# Patient Record
Sex: FEMALE | Race: ASIAN | Employment: FULL TIME | URBAN - METROPOLITAN AREA
[De-identification: names, ages, dates, MRNs, and addresses within clinical notes are randomized per-mention and may not be internally consistent; named-entity substitution may affect disease eponyms.]

---

## 2019-02-20 ENCOUNTER — OFFICE VISIT (OUTPATIENT)
Dept: OBGYN CLINIC | Facility: CLINIC | Age: 44
End: 2019-02-20
Payer: COMMERCIAL

## 2019-02-20 VITALS
HEIGHT: 65 IN | SYSTOLIC BLOOD PRESSURE: 112 MMHG | WEIGHT: 162 LBS | BODY MASS INDEX: 26.99 KG/M2 | DIASTOLIC BLOOD PRESSURE: 62 MMHG | HEART RATE: 74 BPM

## 2019-02-20 DIAGNOSIS — N76.0 VAGINITIS AND VULVOVAGINITIS: ICD-10-CM

## 2019-02-20 DIAGNOSIS — Z01.419 WELL WOMAN EXAM WITH ROUTINE GYNECOLOGICAL EXAM: ICD-10-CM

## 2019-02-20 DIAGNOSIS — Z12.39 SCREENING FOR MALIGNANT NEOPLASM OF BREAST: Primary | ICD-10-CM

## 2019-02-20 DIAGNOSIS — Z12.4 PAPANICOLAOU SMEAR FOR CERVICAL CANCER SCREENING: ICD-10-CM

## 2019-02-20 PROCEDURE — 87624 HPV HI-RISK TYP POOLED RSLT: CPT | Performed by: OBSTETRICS & GYNECOLOGY

## 2019-02-20 PROCEDURE — 87660 TRICHOMONAS VAGIN DIR PROBE: CPT | Performed by: OBSTETRICS & GYNECOLOGY

## 2019-02-20 PROCEDURE — 87480 CANDIDA DNA DIR PROBE: CPT | Performed by: OBSTETRICS & GYNECOLOGY

## 2019-02-20 PROCEDURE — 87510 GARDNER VAG DNA DIR PROBE: CPT | Performed by: OBSTETRICS & GYNECOLOGY

## 2019-02-20 PROCEDURE — 99386 PREV VISIT NEW AGE 40-64: CPT | Performed by: OBSTETRICS & GYNECOLOGY

## 2019-02-20 PROCEDURE — 88175 CYTOPATH C/V AUTO FLUID REDO: CPT | Performed by: OBSTETRICS & GYNECOLOGY

## 2019-02-20 NOTE — PROGRESS NOTES
Figueroa Matson is a 45year old female  Patient's last menstrual period was 2019 (exact date). Patient presents with:  Wellness Visit  . He was  before no pregnancy.   Is not sexually active for 12 years she thinks she will be getting m Attends [employer] service: Not on file        Active member of club or organization: Not on file        Attends meetings of clubs or organizations: Not on file        Relationship status: Not on file      Intimate partner violence:        Fear of current weakness or numbness.       PHYSICAL EXAM:   Constitutional: well developed, well nourished  Head/Face: normocephalic  Neck/Thyroid: thyroid symmetric, no thyromegaly, no nodules, no adenopathy  Breast: normal without palpable masses, tenderness, asymmetry,

## 2019-02-21 LAB — HPV I/H RISK 1 DNA SPEC QL NAA+PROBE: NEGATIVE

## 2019-02-28 NOTE — PROGRESS NOTES
Called pt and LMOVM on cell phone with normal results per Hippa. Left call back number for any further questions.

## 2019-08-06 ENCOUNTER — OFFICE VISIT (OUTPATIENT)
Dept: OBGYN CLINIC | Facility: CLINIC | Age: 44
End: 2019-08-06
Payer: COMMERCIAL

## 2019-08-06 VITALS
HEART RATE: 80 BPM | DIASTOLIC BLOOD PRESSURE: 60 MMHG | SYSTOLIC BLOOD PRESSURE: 118 MMHG | BODY MASS INDEX: 26.66 KG/M2 | WEIGHT: 160 LBS | HEIGHT: 65 IN

## 2019-08-06 DIAGNOSIS — Z30.430 ENCOUNTER FOR INSERTION OF PARAGARD IUD: Primary | ICD-10-CM

## 2019-08-06 DIAGNOSIS — Z30.430 ENCOUNTER FOR INSERTION OF INTRAUTERINE CONTRACEPTIVE DEVICE: ICD-10-CM

## 2019-08-06 DIAGNOSIS — Z97.5 IUD CONTRACEPTION: ICD-10-CM

## 2019-08-06 LAB
CONTROL LINE PRESENT WITH A CLEAR BACKGROUND (YES/NO): YES YES/NO
PREGNANCY TEST, URINE: NEGATIVE

## 2019-08-06 PROCEDURE — 81025 URINE PREGNANCY TEST: CPT | Performed by: OBSTETRICS & GYNECOLOGY

## 2019-08-06 PROCEDURE — 58300 INSERT INTRAUTERINE DEVICE: CPT | Performed by: OBSTETRICS & GYNECOLOGY

## 2019-08-06 RX ORDER — COPPER 313.4 MG/1
1 INTRAUTERINE DEVICE INTRAUTERINE ONCE
Status: COMPLETED | OUTPATIENT
Start: 2019-08-06 | End: 2019-08-06

## 2019-08-06 RX ORDER — IBUPROFEN 200 MG
400 TABLET ORAL ONCE
Status: COMPLETED | OUTPATIENT
Start: 2019-08-06 | End: 2019-08-06

## 2019-08-06 RX ADMIN — IBUPROFEN 400 MG: 200 MG TABLET ORAL at 19:22:00

## 2019-08-06 RX ADMIN — COPPER 1 DEVICE: 313.4 INTRAUTERINE DEVICE INTRAUTERINE at 14:54:00

## 2019-08-06 NOTE — PROGRESS NOTES
IUD Insertion     Pregnancy Results: negative from urine test   Birth control method(s) used:   LMP: 3 d ago    Consent signed. Procedure discussed with the patient in detail including indication, risks, benefits, alternatives and complications.     Proced

## 2019-08-22 ENCOUNTER — TELEPHONE (OUTPATIENT)
Dept: OBGYN CLINIC | Facility: CLINIC | Age: 44
End: 2019-08-22

## 2019-08-22 NOTE — TELEPHONE ENCOUNTER
Patient reassured that spotting/vaginal bleeding is normal for the first month. Some cramping is also normal but she should not have a lot of pain.  Patient states she will give her body more time to adjust.

## 2019-08-22 NOTE — TELEPHONE ENCOUNTER
Per pt she got her paragard iud on 08-06-19 and since then she has been bleeding and experiencing pain. She would like to talk to her about that and ask you if that is normal or not.  She also would like to talk about what her options are or what it can be

## 2019-08-29 ENCOUNTER — OFFICE VISIT (OUTPATIENT)
Dept: OBGYN CLINIC | Facility: CLINIC | Age: 44
End: 2019-08-29
Payer: COMMERCIAL

## 2019-08-29 VITALS
HEIGHT: 65 IN | HEART RATE: 98 BPM | SYSTOLIC BLOOD PRESSURE: 110 MMHG | WEIGHT: 162 LBS | BODY MASS INDEX: 26.99 KG/M2 | DIASTOLIC BLOOD PRESSURE: 66 MMHG

## 2019-08-29 DIAGNOSIS — Z30.432 ENCOUNTER FOR REMOVAL OF INTRAUTERINE CONTRACEPTIVE DEVICE: Primary | ICD-10-CM

## 2019-08-29 PROBLEM — Z97.5 IUD CONTRACEPTION: Status: RESOLVED | Noted: 2019-08-06 | Resolved: 2019-08-29

## 2019-08-29 PROCEDURE — 58301 REMOVE INTRAUTERINE DEVICE: CPT | Performed by: OBSTETRICS & GYNECOLOGY

## 2019-08-29 PROCEDURE — 99213 OFFICE O/P EST LOW 20 MIN: CPT | Performed by: OBSTETRICS & GYNECOLOGY

## 2019-08-29 RX ORDER — IBUPROFEN 200 MG
200 TABLET ORAL ONCE
Status: COMPLETED | OUTPATIENT
Start: 2019-08-29 | End: 2019-08-29

## 2019-08-29 RX ADMIN — IBUPROFEN 200 MG: 200 MG TABLET ORAL at 16:58:00

## 2019-08-29 NOTE — PROGRESS NOTES
IUD Removal     Consent signed. Procedure discussed with the patient in detail including indication, risks, benefits, alternatives and complications.     Pelvic Exam Findings:  Lesion description:  IUD strings seen from cervix    Procedure:  Speculum yaw

## 2020-02-05 ENCOUNTER — APPOINTMENT (OUTPATIENT)
Dept: OBGYN CLINIC | Facility: CLINIC | Age: 45
End: 2020-02-05

## 2020-07-08 PROBLEM — E61.1 IRON DEFICIENCY: Status: ACTIVE | Noted: 2020-07-08

## 2020-07-08 RX ORDER — ACETAMINOPHEN 325 MG/1
650 TABLET ORAL ONCE
Status: CANCELLED
Start: 2020-07-08

## 2020-07-16 ENCOUNTER — OFFICE VISIT (OUTPATIENT)
Dept: HEMATOLOGY/ONCOLOGY | Facility: HOSPITAL | Age: 45
End: 2020-07-16
Attending: FAMILY MEDICINE
Payer: COMMERCIAL

## 2020-07-16 VITALS
RESPIRATION RATE: 18 BRPM | DIASTOLIC BLOOD PRESSURE: 83 MMHG | SYSTOLIC BLOOD PRESSURE: 124 MMHG | OXYGEN SATURATION: 99 % | TEMPERATURE: 98 F

## 2020-07-16 DIAGNOSIS — E61.1 IRON DEFICIENCY: Primary | ICD-10-CM

## 2020-07-16 PROCEDURE — 96365 THER/PROPH/DIAG IV INF INIT: CPT

## 2020-07-16 PROCEDURE — 96376 TX/PRO/DX INJ SAME DRUG ADON: CPT

## 2020-07-16 RX ORDER — ACETAMINOPHEN 325 MG/1
650 TABLET ORAL ONCE
Status: CANCELLED
Start: 2020-07-16

## 2020-07-16 RX ORDER — ACETAMINOPHEN 325 MG/1
650 TABLET ORAL ONCE
Status: COMPLETED | OUTPATIENT
Start: 2020-07-16 | End: 2020-07-16

## 2020-07-16 RX ADMIN — ACETAMINOPHEN 650 MG: 325 TABLET ORAL at 12:10:00

## 2020-07-16 NOTE — PROGRESS NOTES
Education Record    Learner:  Patient    Disease / Diagnosis:Iron deficiency    Barriers / Limitations:  None   Comments:    Method:  Brief focused   Comments:    General Topics:  Infection, Medication, Pain, Precautions, Procedure, Side effects and sympto

## 2020-07-16 NOTE — PATIENT INSTRUCTIONS
Iron Dextran injection  Brand Name: Rebecca Cochran  What is this medicine? IRON DEXTRAN (AHY helio DEX castro) is an iron complex. Iron is used to make healthy red blood cells, which carry oxygen and nutrients through the body.  This medicine is used to treat people It is important not to miss your dose. Call your doctor or health care professional if you are unable to keep an appointment. Where should I keep my medicine? This drug is given in a hospital or clinic and will not be stored at home.   What should I tell

## 2021-07-13 ENCOUNTER — OFFICE VISIT (OUTPATIENT)
Dept: INTERNAL MEDICINE CLINIC | Facility: CLINIC | Age: 46
End: 2021-07-13
Payer: COMMERCIAL

## 2021-07-13 VITALS
BODY MASS INDEX: 28.46 KG/M2 | HEART RATE: 76 BPM | SYSTOLIC BLOOD PRESSURE: 118 MMHG | HEIGHT: 65 IN | DIASTOLIC BLOOD PRESSURE: 62 MMHG | WEIGHT: 170.81 LBS | OXYGEN SATURATION: 98 % | TEMPERATURE: 99 F

## 2021-07-13 DIAGNOSIS — Z13.29 SCREENING FOR THYROID DISORDER: ICD-10-CM

## 2021-07-13 DIAGNOSIS — Z13.220 SCREENING, LIPID: ICD-10-CM

## 2021-07-13 DIAGNOSIS — Z13.0 SCREENING FOR DEFICIENCY ANEMIA: ICD-10-CM

## 2021-07-13 DIAGNOSIS — Z00.00 ANNUAL PHYSICAL EXAM: Primary | ICD-10-CM

## 2021-07-13 DIAGNOSIS — R79.0 LOW FERRITIN: ICD-10-CM

## 2021-07-13 DIAGNOSIS — Z12.31 VISIT FOR SCREENING MAMMOGRAM: ICD-10-CM

## 2021-07-13 DIAGNOSIS — M79.7 FIBROMYALGIA: ICD-10-CM

## 2021-07-13 DIAGNOSIS — R53.83 FATIGUE, UNSPECIFIED TYPE: ICD-10-CM

## 2021-07-13 DIAGNOSIS — Z78.9 VEGAN: ICD-10-CM

## 2021-07-13 DIAGNOSIS — Z13.1 SCREENING FOR DIABETES MELLITUS: ICD-10-CM

## 2021-07-13 PROCEDURE — 3074F SYST BP LT 130 MM HG: CPT | Performed by: FAMILY MEDICINE

## 2021-07-13 PROCEDURE — 99202 OFFICE O/P NEW SF 15 MIN: CPT | Performed by: FAMILY MEDICINE

## 2021-07-13 PROCEDURE — 3008F BODY MASS INDEX DOCD: CPT | Performed by: FAMILY MEDICINE

## 2021-07-13 PROCEDURE — 3078F DIAST BP <80 MM HG: CPT | Performed by: FAMILY MEDICINE

## 2021-07-13 PROCEDURE — 99386 PREV VISIT NEW AGE 40-64: CPT | Performed by: FAMILY MEDICINE

## 2021-07-13 NOTE — PATIENT INSTRUCTIONS
Prevention Guidelines, Women Ages 36 to 52  Screening tests and vaccines are an important part of managing your health. A screening test is done to find diseases in people who don't have any symptoms.  The goal is to find a disease early so lifestyle arias sigmoidoscopy every 5 years, or  · Colonoscopy every 10 years, or  · CT colonography (virtual colonoscopy) every 5 years, or  · Yearly fecal occult blood test, or  · Yearly fecal immunochemical test every year, or  · Stool DNA test, every 3 years  If you c least 4 weeks after the first dose   Hepatitis A Women at increased risk for infection–talk with your healthcare provider 2 doses given 6 months apart   Hepatitis B Women at increased risk for infection–talk with your healthcare provider 3 doses over 6 mon American Academy of Ophthalmology  Alfonso last reviewed this educational content on 11/1/2017  © 0725-8075 [employer]. All rights reserved. This information is not intended as a substitute for professional medical care.  Always follow your

## 2021-07-14 NOTE — PROGRESS NOTES
HPI/Subjective:   Patient ID: Carolina Trotter is a 42year old female. HPI Here for annual check-up and to establish care. Patient has Gyne and is up to date on pap. Is due for mammogram.   Tries to eat healthy, eats vegan and is trying to limit gluten.  Has and frequency. Musculoskeletal: Positive for myalgias. Negative for arthralgias and joint swelling. Skin: Negative for rash. Neurological: Negative for dizziness, weakness, numbness and headaches. Hematological: Negative for adenopathy.  Does not br unspecified type  1-5. Reviewed age-appropriate preventive health and safety recommendations with patient. Reviewed lab results. Encouraged regular exercise and healthy eating. 6. Mammogram ordered. 7. Check iron studies.  Discussed with patient dependi

## 2021-07-30 ENCOUNTER — HOSPITAL ENCOUNTER (OUTPATIENT)
Dept: MAMMOGRAPHY | Facility: HOSPITAL | Age: 46
Discharge: HOME | End: 2021-07-30
Attending: FAMILY MEDICINE
Payer: COMMERCIAL

## 2021-07-30 DIAGNOSIS — Z12.31 VISIT FOR SCREENING MAMMOGRAM: ICD-10-CM

## 2021-07-30 PROCEDURE — 77063 BREAST TOMOSYNTHESIS BI: CPT | Performed by: FAMILY MEDICINE

## 2021-07-30 PROCEDURE — 77067 SCR MAMMO BI INCL CAD: CPT | Performed by: FAMILY MEDICINE

## 2021-08-06 ENCOUNTER — HOSPITAL ENCOUNTER (OUTPATIENT)
Dept: MAMMOGRAPHY | Facility: HOSPITAL | Age: 46
Discharge: HOME | End: 2021-08-06
Attending: FAMILY MEDICINE
Payer: COMMERCIAL

## 2021-08-06 DIAGNOSIS — R92.2 INCONCLUSIVE MAMMOGRAM: ICD-10-CM

## 2021-08-06 PROCEDURE — 77061 BREAST TOMOSYNTHESIS UNI: CPT | Performed by: FAMILY MEDICINE

## 2021-08-06 PROCEDURE — 77065 DX MAMMO INCL CAD UNI: CPT | Performed by: FAMILY MEDICINE

## 2021-08-06 PROCEDURE — 76642 ULTRASOUND BREAST LIMITED: CPT | Performed by: FAMILY MEDICINE

## 2021-08-09 DIAGNOSIS — R92.8 ABNORMAL MAMMOGRAM: Primary | ICD-10-CM

## 2021-08-20 ENCOUNTER — LABORATORY ENCOUNTER (OUTPATIENT)
Dept: LAB | Age: 46
End: 2021-08-20
Attending: FAMILY MEDICINE
Payer: COMMERCIAL

## 2021-08-20 DIAGNOSIS — M79.7 FIBROMYALGIA: ICD-10-CM

## 2021-08-20 DIAGNOSIS — Z78.9 VEGAN: ICD-10-CM

## 2021-08-20 DIAGNOSIS — R79.0 LOW FERRITIN: ICD-10-CM

## 2021-08-20 DIAGNOSIS — Z13.220 SCREENING, LIPID: ICD-10-CM

## 2021-08-20 DIAGNOSIS — Z13.29 SCREENING FOR THYROID DISORDER: ICD-10-CM

## 2021-08-20 DIAGNOSIS — Z13.1 SCREENING FOR DIABETES MELLITUS: ICD-10-CM

## 2021-08-20 DIAGNOSIS — Z13.0 SCREENING FOR DEFICIENCY ANEMIA: ICD-10-CM

## 2021-08-20 LAB
ALBUMIN SERPL-MCNC: 3.8 G/DL (ref 3.4–5)
ALBUMIN/GLOB SERPL: 1 {RATIO} (ref 1–2)
ALP LIVER SERPL-CCNC: 56 U/L
ALT SERPL-CCNC: 31 U/L
ANION GAP SERPL CALC-SCNC: 6 MMOL/L (ref 0–18)
AST SERPL-CCNC: 21 U/L (ref 15–37)
BASOPHILS # BLD AUTO: 0.08 X10(3) UL (ref 0–0.2)
BASOPHILS NFR BLD AUTO: 1.1 %
BILIRUB SERPL-MCNC: 0.5 MG/DL (ref 0.1–2)
BUN BLD-MCNC: 9 MG/DL (ref 7–18)
CALCIUM BLD-MCNC: 9 MG/DL (ref 8.5–10.1)
CHLORIDE SERPL-SCNC: 106 MMOL/L (ref 98–112)
CHOLEST SMN-MCNC: 175 MG/DL (ref ?–200)
CO2 SERPL-SCNC: 25 MMOL/L (ref 21–32)
CREAT BLD-MCNC: 0.53 MG/DL
DEPRECATED HBV CORE AB SER IA-ACNC: 88.3 NG/ML
EOSINOPHIL # BLD AUTO: 0.12 X10(3) UL (ref 0–0.7)
EOSINOPHIL NFR BLD AUTO: 1.7 %
ERYTHROCYTE [DISTWIDTH] IN BLOOD BY AUTOMATED COUNT: 12.6 %
FOLATE SERPL-MCNC: 29.1 NG/ML (ref 8.7–?)
GLOBULIN PLAS-MCNC: 3.7 G/DL (ref 2.8–4.4)
GLUCOSE BLD-MCNC: 91 MG/DL (ref 70–99)
HCT VFR BLD AUTO: 41.2 %
HDLC SERPL-MCNC: 69 MG/DL (ref 40–59)
HGB BLD-MCNC: 12.9 G/DL
IMM GRANULOCYTES # BLD AUTO: 0.03 X10(3) UL (ref 0–1)
IMM GRANULOCYTES NFR BLD: 0.4 %
IRON SATURATION: 29 %
IRON SERPL-MCNC: 92 UG/DL
LDLC SERPL CALC-MCNC: 94 MG/DL (ref ?–100)
LYMPHOCYTES # BLD AUTO: 2.88 X10(3) UL (ref 1–4)
LYMPHOCYTES NFR BLD AUTO: 40.2 %
M PROTEIN MFR SERPL ELPH: 7.5 G/DL (ref 6.4–8.2)
MCH RBC QN AUTO: 30.3 PG (ref 26–34)
MCHC RBC AUTO-ENTMCNC: 31.3 G/DL (ref 31–37)
MCV RBC AUTO: 96.7 FL
MONOCYTES # BLD AUTO: 0.54 X10(3) UL (ref 0.1–1)
MONOCYTES NFR BLD AUTO: 7.5 %
NEUTROPHILS # BLD AUTO: 3.52 X10 (3) UL (ref 1.5–7.7)
NEUTROPHILS # BLD AUTO: 3.52 X10(3) UL (ref 1.5–7.7)
NEUTROPHILS NFR BLD AUTO: 49.1 %
NONHDLC SERPL-MCNC: 106 MG/DL (ref ?–130)
OSMOLALITY SERPL CALC.SUM OF ELEC: 282 MOSM/KG (ref 275–295)
PATIENT FASTING Y/N/NP: YES
PATIENT FASTING Y/N/NP: YES
PLATELET # BLD AUTO: 254 10(3)UL (ref 150–450)
POTASSIUM SERPL-SCNC: 4.1 MMOL/L (ref 3.5–5.1)
RBC # BLD AUTO: 4.26 X10(6)UL
SODIUM SERPL-SCNC: 137 MMOL/L (ref 136–145)
TOTAL IRON BINDING CAPACITY: 316 UG/DL (ref 240–450)
TRANSFERRIN SERPL-MCNC: 212 MG/DL (ref 200–360)
TRIGL SERPL-MCNC: 65 MG/DL (ref 30–149)
TSI SER-ACNC: 2.72 MIU/ML (ref 0.36–3.74)
VIT B12 SERPL-MCNC: 441 PG/ML (ref 193–986)
VIT D+METAB SERPL-MCNC: 23.4 NG/ML (ref 30–100)
VLDLC SERPL CALC-MCNC: 11 MG/DL (ref 0–30)
WBC # BLD AUTO: 7.2 X10(3) UL (ref 4–11)

## 2021-08-20 PROCEDURE — 80061 LIPID PANEL: CPT | Performed by: FAMILY MEDICINE

## 2021-08-20 PROCEDURE — 83550 IRON BINDING TEST: CPT | Performed by: FAMILY MEDICINE

## 2021-08-20 PROCEDURE — 83540 ASSAY OF IRON: CPT | Performed by: FAMILY MEDICINE

## 2021-08-20 PROCEDURE — 82728 ASSAY OF FERRITIN: CPT | Performed by: FAMILY MEDICINE

## 2021-08-20 PROCEDURE — 80050 GENERAL HEALTH PANEL: CPT | Performed by: FAMILY MEDICINE

## 2021-08-20 PROCEDURE — 82607 VITAMIN B-12: CPT | Performed by: FAMILY MEDICINE

## 2021-08-20 PROCEDURE — 82306 VITAMIN D 25 HYDROXY: CPT | Performed by: FAMILY MEDICINE

## 2021-08-20 PROCEDURE — 82746 ASSAY OF FOLIC ACID SERUM: CPT | Performed by: FAMILY MEDICINE

## 2022-01-31 ENCOUNTER — TELEPHONE (OUTPATIENT)
Dept: INTERNAL MEDICINE CLINIC | Facility: CLINIC | Age: 47
End: 2022-01-31

## 2022-01-31 NOTE — TELEPHONE ENCOUNTER
Pt states that she got a letter that she is due for 6 month follow-up breast ultrasound, please advise?  She would like a call once placed so she can call and scehdule

## 2022-01-31 NOTE — TELEPHONE ENCOUNTER
PSR:  Please call patient back to let her know the left breast US has already been ordered.   Thank you

## 2022-03-18 ENCOUNTER — HOSPITAL ENCOUNTER (OUTPATIENT)
Dept: MAMMOGRAPHY | Facility: HOSPITAL | Age: 47
Discharge: HOME | End: 2022-03-18
Attending: FAMILY MEDICINE
Payer: COMMERCIAL

## 2022-03-18 DIAGNOSIS — R92.8 ABNORMAL MAMMOGRAM: ICD-10-CM

## 2022-03-18 PROCEDURE — 76641 ULTRASOUND BREAST COMPLETE: CPT | Performed by: FAMILY MEDICINE

## 2022-07-15 ENCOUNTER — TELEPHONE (OUTPATIENT)
Dept: INTERNAL MEDICINE CLINIC | Facility: CLINIC | Age: 47
End: 2022-07-15

## 2022-08-23 ENCOUNTER — TELEPHONE (OUTPATIENT)
Dept: INTERNAL MEDICINE CLINIC | Facility: CLINIC | Age: 47
End: 2022-08-23

## 2022-08-23 DIAGNOSIS — R79.0 LOW FERRITIN: ICD-10-CM

## 2022-08-23 DIAGNOSIS — Z13.220 SCREENING, LIPID: ICD-10-CM

## 2022-08-23 DIAGNOSIS — M79.7 FIBROMYALGIA: ICD-10-CM

## 2022-08-23 DIAGNOSIS — Z13.0 SCREENING FOR DEFICIENCY ANEMIA: ICD-10-CM

## 2022-08-23 DIAGNOSIS — Z00.00 ANNUAL PHYSICAL EXAM: Primary | ICD-10-CM

## 2022-08-23 DIAGNOSIS — Z13.1 SCREENING FOR DIABETES MELLITUS: ICD-10-CM

## 2022-08-23 NOTE — TELEPHONE ENCOUNTER
Pt sent this via [employer]. I have scheduled lab work on Aug 29th. I also wanted to get my Vitamin D and Ferritin levels tested. Will these be automatically included? Also, I have been experiencing really bad breath (not related to oral hygiene), and wanted to see if the Dr would recommend liver/kidney checkup. I have contact fatigue and pain due to my fybromyalgia, and it is hard to determine if I have any other symptoms of weak liver/kidney function. Is there anything in the blood test that might need to be added for that?

## 2022-08-23 NOTE — TELEPHONE ENCOUNTER
Future Appointments   Date Time Provider Peewee Phan   9/12/2022  1:00 PM Cori Baptiste,  [facility]     Orders to edward- Pt informed that labs need to be completed no sooner than 2 weeks prior to the appt.  Pt aware to fast-no call back required

## 2022-09-14 ENCOUNTER — HOSPITAL ENCOUNTER (OUTPATIENT)
Dept: MAMMOGRAPHY | Facility: HOSPITAL | Age: 47
Discharge: HOME | End: 2022-09-14
Attending: FAMILY MEDICINE
Payer: COMMERCIAL

## 2022-09-14 DIAGNOSIS — R92.8 ABNORMAL MAMMOGRAM: ICD-10-CM

## 2022-09-14 DIAGNOSIS — R92.8 ABNORMAL MAMMOGRAM: Primary | ICD-10-CM

## 2022-09-14 PROCEDURE — 76642 ULTRASOUND BREAST LIMITED: CPT | Performed by: FAMILY MEDICINE

## 2022-09-14 PROCEDURE — 77066 DX MAMMO INCL CAD BI: CPT | Performed by: FAMILY MEDICINE

## 2022-09-14 PROCEDURE — 77062 BREAST TOMOSYNTHESIS BI: CPT | Performed by: FAMILY MEDICINE

## 2022-11-22 ENCOUNTER — TELEPHONE (OUTPATIENT)
Dept: INTERNAL MEDICINE CLINIC | Facility: CLINIC | Age: 47
End: 2022-11-22

## 2022-11-22 NOTE — TELEPHONE ENCOUNTER
Patient notified she can go to the  for treatment or see AMS 11/23/22 at 10am.  Pt states she would like to see AMS at 10m on 11/23/22. Pt scheduled and aware per AMS.

## 2022-11-22 NOTE — TELEPHONE ENCOUNTER
Pt called stating she may have UTI-s/s started yesterday-burning when voiding and burning afterwards-wants appt-nothing available

## 2022-11-23 ENCOUNTER — OFFICE VISIT (OUTPATIENT)
Dept: INTERNAL MEDICINE CLINIC | Facility: CLINIC | Age: 47
End: 2022-11-23
Payer: COMMERCIAL

## 2022-11-23 VITALS
HEART RATE: 92 BPM | DIASTOLIC BLOOD PRESSURE: 64 MMHG | SYSTOLIC BLOOD PRESSURE: 118 MMHG | HEIGHT: 65 IN | RESPIRATION RATE: 20 BRPM | OXYGEN SATURATION: 98 % | WEIGHT: 173 LBS | BODY MASS INDEX: 28.82 KG/M2

## 2022-11-23 DIAGNOSIS — R30.0 DYSURIA: Primary | ICD-10-CM

## 2022-11-23 LAB
APPEARANCE: CLEAR
BILIRUBIN: NEGATIVE
GLUCOSE (URINE DIPSTICK): NEGATIVE MG/DL
KETONES (URINE DIPSTICK): NEGATIVE MG/DL
LEUKOCYTES: NEGATIVE
MULTISTIX LOT#: ABNORMAL NUMERIC
NITRITE, URINE: NEGATIVE
PH, URINE: 7 (ref 4.5–8)
PROTEIN (URINE DIPSTICK): NEGATIVE MG/DL
SPECIFIC GRAVITY: 1.02 (ref 1–1.03)
URINE-COLOR: YELLOW
UROBILINOGEN,SEMI-QN: 0.2 MG/DL (ref 0–1.9)

## 2022-11-23 PROCEDURE — 81003 URINALYSIS AUTO W/O SCOPE: CPT | Performed by: FAMILY MEDICINE

## 2022-11-23 PROCEDURE — 3078F DIAST BP <80 MM HG: CPT | Performed by: FAMILY MEDICINE

## 2022-11-23 PROCEDURE — 3074F SYST BP LT 130 MM HG: CPT | Performed by: FAMILY MEDICINE

## 2022-11-23 PROCEDURE — 3008F BODY MASS INDEX DOCD: CPT | Performed by: FAMILY MEDICINE

## 2022-11-23 PROCEDURE — 99213 OFFICE O/P EST LOW 20 MIN: CPT | Performed by: FAMILY MEDICINE

## 2022-11-23 PROCEDURE — 87086 URINE CULTURE/COLONY COUNT: CPT | Performed by: FAMILY MEDICINE

## 2022-12-13 ENCOUNTER — OFFICE VISIT (OUTPATIENT)
Facility: CLINIC | Age: 47
End: 2022-12-13
Payer: COMMERCIAL

## 2022-12-13 VITALS
HEIGHT: 65 IN | WEIGHT: 175 LBS | BODY MASS INDEX: 29.16 KG/M2 | DIASTOLIC BLOOD PRESSURE: 52 MMHG | HEART RATE: 88 BPM | SYSTOLIC BLOOD PRESSURE: 110 MMHG

## 2022-12-13 DIAGNOSIS — N76.0 VAGINITIS AND VULVOVAGINITIS: ICD-10-CM

## 2022-12-13 DIAGNOSIS — Z12.4 PAPANICOLAOU SMEAR FOR CERVICAL CANCER SCREENING: ICD-10-CM

## 2022-12-13 DIAGNOSIS — Z01.419 WELL WOMAN EXAM WITH ROUTINE GYNECOLOGICAL EXAM: ICD-10-CM

## 2022-12-13 DIAGNOSIS — Z12.31 ENCOUNTER FOR SCREENING MAMMOGRAM FOR BREAST CANCER: Primary | ICD-10-CM

## 2022-12-13 PROCEDURE — 87510 GARDNER VAG DNA DIR PROBE: CPT | Performed by: OBSTETRICS & GYNECOLOGY

## 2022-12-13 PROCEDURE — 3078F DIAST BP <80 MM HG: CPT | Performed by: OBSTETRICS & GYNECOLOGY

## 2022-12-13 PROCEDURE — 99396 PREV VISIT EST AGE 40-64: CPT | Performed by: OBSTETRICS & GYNECOLOGY

## 2022-12-13 PROCEDURE — 3008F BODY MASS INDEX DOCD: CPT | Performed by: OBSTETRICS & GYNECOLOGY

## 2022-12-13 PROCEDURE — 3074F SYST BP LT 130 MM HG: CPT | Performed by: OBSTETRICS & GYNECOLOGY

## 2022-12-13 PROCEDURE — 87480 CANDIDA DNA DIR PROBE: CPT | Performed by: OBSTETRICS & GYNECOLOGY

## 2022-12-13 PROCEDURE — 87660 TRICHOMONAS VAGIN DIR PROBE: CPT | Performed by: OBSTETRICS & GYNECOLOGY

## 2022-12-13 RX ORDER — ERGOCALCIFEROL 1.25 MG/1
CAPSULE ORAL
COMMUNITY

## 2022-12-16 ENCOUNTER — LAB ENCOUNTER (OUTPATIENT)
Dept: LAB | Age: 47
End: 2022-12-16
Attending: FAMILY MEDICINE
Payer: COMMERCIAL

## 2022-12-16 ENCOUNTER — TELEPHONE (OUTPATIENT)
Facility: CLINIC | Age: 47
End: 2022-12-16

## 2022-12-16 DIAGNOSIS — Z13.0 SCREENING FOR DEFICIENCY ANEMIA: ICD-10-CM

## 2022-12-16 DIAGNOSIS — Z00.00 ANNUAL PHYSICAL EXAM: ICD-10-CM

## 2022-12-16 DIAGNOSIS — R79.0 LOW FERRITIN: ICD-10-CM

## 2022-12-16 DIAGNOSIS — M79.7 FIBROMYALGIA: ICD-10-CM

## 2022-12-16 DIAGNOSIS — Z13.220 SCREENING, LIPID: ICD-10-CM

## 2022-12-16 DIAGNOSIS — Z13.1 SCREENING FOR DIABETES MELLITUS: ICD-10-CM

## 2022-12-16 DIAGNOSIS — Z01.419 WELL WOMAN EXAM WITH ROUTINE GYNECOLOGICAL EXAM: ICD-10-CM

## 2022-12-16 LAB
ALBUMIN SERPL-MCNC: 3.6 G/DL (ref 3.4–5)
ALBUMIN/GLOB SERPL: 1 {RATIO} (ref 1–2)
ALP LIVER SERPL-CCNC: 57 U/L
ALT SERPL-CCNC: 21 U/L
ANION GAP SERPL CALC-SCNC: 5 MMOL/L (ref 0–18)
AST SERPL-CCNC: 15 U/L (ref 15–37)
BASOPHILS # BLD AUTO: 0.08 X10(3) UL (ref 0–0.2)
BASOPHILS NFR BLD AUTO: 1.3 %
BILIRUB SERPL-MCNC: 0.3 MG/DL (ref 0.1–2)
BUN BLD-MCNC: 11 MG/DL (ref 7–18)
CALCIUM BLD-MCNC: 8.6 MG/DL (ref 8.5–10.1)
CHLORIDE SERPL-SCNC: 105 MMOL/L (ref 98–112)
CHOLEST SERPL-MCNC: 183 MG/DL (ref ?–200)
CO2 SERPL-SCNC: 25 MMOL/L (ref 21–32)
CREAT BLD-MCNC: 0.59 MG/DL
DEPRECATED HBV CORE AB SER IA-ACNC: 11.2 NG/ML
EOSINOPHIL # BLD AUTO: 0.24 X10(3) UL (ref 0–0.7)
EOSINOPHIL NFR BLD AUTO: 3.9 %
ERYTHROCYTE [DISTWIDTH] IN BLOOD BY AUTOMATED COUNT: 13.1 %
FASTING PATIENT LIPID ANSWER: YES
FASTING STATUS PATIENT QL REPORTED: YES
GFR SERPLBLD BASED ON 1.73 SQ M-ARVRAT: 113 ML/MIN/1.73M2 (ref 60–?)
GLOBULIN PLAS-MCNC: 3.7 G/DL (ref 2.8–4.4)
GLUCOSE BLD-MCNC: 92 MG/DL (ref 70–99)
HCT VFR BLD AUTO: 38.1 %
HDLC SERPL-MCNC: 69 MG/DL (ref 40–59)
HGB BLD-MCNC: 12.4 G/DL
IMM GRANULOCYTES # BLD AUTO: 0.02 X10(3) UL (ref 0–1)
IMM GRANULOCYTES NFR BLD: 0.3 %
IRON SATN MFR SERPL: 12 %
IRON SERPL-MCNC: 46 UG/DL
LDLC SERPL CALC-MCNC: 99 MG/DL (ref ?–100)
LYMPHOCYTES # BLD AUTO: 2.61 X10(3) UL (ref 1–4)
LYMPHOCYTES NFR BLD AUTO: 42.7 %
MCH RBC QN AUTO: 29.7 PG (ref 26–34)
MCHC RBC AUTO-ENTMCNC: 32.5 G/DL (ref 31–37)
MCV RBC AUTO: 91.4 FL
MONOCYTES # BLD AUTO: 0.4 X10(3) UL (ref 0.1–1)
MONOCYTES NFR BLD AUTO: 6.5 %
NEUTROPHILS # BLD AUTO: 2.76 X10 (3) UL (ref 1.5–7.7)
NEUTROPHILS # BLD AUTO: 2.76 X10(3) UL (ref 1.5–7.7)
NEUTROPHILS NFR BLD AUTO: 45.3 %
NONHDLC SERPL-MCNC: 114 MG/DL (ref ?–130)
OSMOLALITY SERPL CALC.SUM OF ELEC: 279 MOSM/KG (ref 275–295)
PLATELET # BLD AUTO: 275 10(3)UL (ref 150–450)
POTASSIUM SERPL-SCNC: 4.2 MMOL/L (ref 3.5–5.1)
PROT SERPL-MCNC: 7.3 G/DL (ref 6.4–8.2)
RBC # BLD AUTO: 4.17 X10(6)UL
SODIUM SERPL-SCNC: 135 MMOL/L (ref 136–145)
TIBC SERPL-MCNC: 398 UG/DL (ref 240–450)
TRANSFERRIN SERPL-MCNC: 267 MG/DL (ref 200–360)
TRIGL SERPL-MCNC: 79 MG/DL (ref 30–149)
TSI SER-ACNC: 3.72 MIU/ML (ref 0.36–3.74)
VIT D+METAB SERPL-MCNC: 30.7 NG/ML (ref 30–100)
VLDLC SERPL CALC-MCNC: 13 MG/DL (ref 0–30)
WBC # BLD AUTO: 6.1 X10(3) UL (ref 4–11)

## 2022-12-16 PROCEDURE — 83550 IRON BINDING TEST: CPT | Performed by: FAMILY MEDICINE

## 2022-12-16 PROCEDURE — 84443 ASSAY THYROID STIM HORMONE: CPT | Performed by: OBSTETRICS & GYNECOLOGY

## 2022-12-16 PROCEDURE — 85025 COMPLETE CBC W/AUTO DIFF WBC: CPT | Performed by: FAMILY MEDICINE

## 2022-12-16 PROCEDURE — 80053 COMPREHEN METABOLIC PANEL: CPT | Performed by: FAMILY MEDICINE

## 2022-12-16 PROCEDURE — 82306 VITAMIN D 25 HYDROXY: CPT | Performed by: FAMILY MEDICINE

## 2022-12-16 PROCEDURE — 80061 LIPID PANEL: CPT | Performed by: FAMILY MEDICINE

## 2022-12-16 PROCEDURE — 82728 ASSAY OF FERRITIN: CPT | Performed by: FAMILY MEDICINE

## 2022-12-16 PROCEDURE — 83540 ASSAY OF IRON: CPT | Performed by: FAMILY MEDICINE

## 2022-12-16 NOTE — PROGRESS NOTES
Pt aware of results & recommendations: May start synthroid 25  or repeat tsh I year. Pt will wait and repeat in 1 year. Verbalized understanding.

## 2022-12-16 NOTE — TELEPHONE ENCOUNTER
Spoke with pt. I gave her [facility]'s phone number to contact to schedule a colonoscopy. Verbalized understanding.

## 2022-12-26 LAB — HPV I/H RISK 1 DNA SPEC QL NAA+PROBE: NEGATIVE

## 2022-12-27 LAB — LAST PAP RESULT: NORMAL

## 2023-01-03 ENCOUNTER — PATIENT MESSAGE (OUTPATIENT)
Dept: INTERNAL MEDICINE CLINIC | Facility: CLINIC | Age: 48
End: 2023-01-03

## 2023-01-03 NOTE — TELEPHONE ENCOUNTER
From: Kip Szymanski  To: Wellington Rojas DO  Sent: 1/3/2023 10:47 AM CST  Subject: Persistent Cough    Hi Dr. Amadeo Jameson! I have persistent cough since last night. Before that for 2 days had a sore throat. I have been taking home remedies. Is this something I need to get checked up?     Thank you,  Ann

## 2023-03-15 ENCOUNTER — TELEPHONE (OUTPATIENT)
Facility: CLINIC | Age: 48
End: 2023-03-15

## 2023-06-14 ENCOUNTER — TELEPHONE (OUTPATIENT)
Dept: INTERNAL MEDICINE CLINIC | Facility: CLINIC | Age: 48
End: 2023-06-14

## 2023-06-14 DIAGNOSIS — Z13.228 SCREENING FOR METABOLIC DISORDER: ICD-10-CM

## 2023-06-14 DIAGNOSIS — Z13.220 SCREENING FOR LIPID DISORDERS: ICD-10-CM

## 2023-06-14 DIAGNOSIS — Z00.00 ROUTINE GENERAL MEDICAL EXAMINATION AT A HEALTH CARE FACILITY: Primary | ICD-10-CM

## 2023-06-14 DIAGNOSIS — Z13.0 SCREENING FOR DISORDER OF BLOOD AND BLOOD-FORMING ORGANS: ICD-10-CM

## 2023-06-14 DIAGNOSIS — R79.0 LOW FERRITIN: ICD-10-CM

## 2023-06-14 NOTE — TELEPHONE ENCOUNTER
Future Appointments   Date Time Provider Peewee Phan   7/3/2023  3:30 PM Mateus Garcia DO [facility]     Patient is scheduled for Annual Physical.  Please place orders with THE [facility]. Patient aware to fast.  No call back required. Patient informed that labs need to be completed no sooner than 2 weeks prior to the appointment.

## 2023-07-03 ENCOUNTER — OFFICE VISIT (OUTPATIENT)
Dept: INTERNAL MEDICINE CLINIC | Facility: CLINIC | Age: 48
End: 2023-07-03
Payer: COMMERCIAL

## 2023-07-03 VITALS
HEIGHT: 65 IN | SYSTOLIC BLOOD PRESSURE: 108 MMHG | BODY MASS INDEX: 27.66 KG/M2 | OXYGEN SATURATION: 98 % | TEMPERATURE: 98 F | DIASTOLIC BLOOD PRESSURE: 60 MMHG | HEART RATE: 67 BPM | WEIGHT: 166 LBS | RESPIRATION RATE: 18 BRPM

## 2023-07-03 DIAGNOSIS — R53.82 CHRONIC FATIGUE: ICD-10-CM

## 2023-07-03 DIAGNOSIS — Z00.00 ANNUAL PHYSICAL EXAM: Primary | ICD-10-CM

## 2023-07-03 DIAGNOSIS — E61.1 IRON DEFICIENCY: ICD-10-CM

## 2023-07-03 PROCEDURE — 3078F DIAST BP <80 MM HG: CPT | Performed by: FAMILY MEDICINE

## 2023-07-03 PROCEDURE — 3008F BODY MASS INDEX DOCD: CPT | Performed by: FAMILY MEDICINE

## 2023-07-03 PROCEDURE — 3074F SYST BP LT 130 MM HG: CPT | Performed by: FAMILY MEDICINE

## 2023-07-03 PROCEDURE — 99213 OFFICE O/P EST LOW 20 MIN: CPT | Performed by: FAMILY MEDICINE

## 2023-07-03 PROCEDURE — 99396 PREV VISIT EST AGE 40-64: CPT | Performed by: FAMILY MEDICINE

## 2023-07-04 NOTE — PROGRESS NOTES
Subjective:   Patient ID: iLnh Bray is a 57year old female. HPI Here for annual check-up. Patient continues to struggle with chronic fatigue. Seeing functional medicine specialist and is on multiple supplements. Taking iron and is constipating for her. Struggling to exercise because of her fatigue. History/Other:   Past Medical History:   Diagnosis Date    Sleep apnea      Past Surgical History:   Procedure Laterality Date    INSERT INTRAUTERINE DEVICE  08/06/2019    Copper IUD inserted - Dr. Dian Leo  08/29/2019    C/o spotting, cramping, acne. IUD removed less than 1 month from placement - Dr. Haider Muniz History     Socioeconomic History    Marital status:    Tobacco Use    Smoking status: Never    Smokeless tobacco: Never   Vaping Use    Vaping Use: Never used   Substance and Sexual Activity    Alcohol use: No    Drug use: No   Other Topics Concern    Caffeine Concern No     Comment: 2 cups coffee 1 cup tea daily    Exercise Yes     Comment: 3-4 d/wk    Seat Belt Yes     Family History   Problem Relation Age of Onset    Prostate Cancer Father 68    Heart Disorder Father     Heart Surgery Father         stents in  heart    Diabetes Mother     Pacemaker Mother     No Known Problems Maternal Grandmother     No Known Problems Maternal Grandfather     No Known Problems Paternal Grandmother     No Known Problems Paternal Grandfather     Colon Cancer Neg     Pancreatic Cancer Neg     Uterine Cancer Neg     Ovarian Cancer Neg     Breast Cancer Neg     Endometriosis Neg     Infertility Neg        Review of Systems   Constitutional:  Positive for fatigue. Negative for activity change, appetite change and fever. HENT:  Negative for ear pain, hearing loss and rhinorrhea. Eyes:  Negative for visual disturbance. Respiratory:  Negative for cough and shortness of breath.     Cardiovascular:  Negative for chest pain, palpitations and leg swelling. Gastrointestinal:  Negative for abdominal pain and constipation. Endocrine: Negative for polydipsia, polyphagia and polyuria. Genitourinary:  Negative for dysuria and frequency. Musculoskeletal:  Negative for arthralgias and joint swelling. Skin:  Negative for rash. Neurological:  Negative for dizziness, weakness, numbness and headaches. Hematological:  Negative for adenopathy. Does not bruise/bleed easily. Psychiatric/Behavioral:  Negative for dysphoric mood. The patient is not nervous/anxious. Current Outpatient Medications   Medication Sig Dispense Refill    Misc Natural Products (SAW PALMETTO PLUS) Oral Cap Take by mouth. Digestive Enzymes (BETAINE HCL) 650-130 MG Oral Cap Take by mouth.      ergocalciferol 1.25 MG (93568 UT) Oral Cap Take by mouth every 7 days. Multiple Vitamins-Minerals (MULTIVITAMIN ADULT OR) Take by mouth. Allergies:  Egg                     DIARRHEA, NAUSEA AND VOMITING,                            OTHER (SEE COMMENTS)    Comment:Stomach aches  Mushroom Extract Co*    DIARRHEA, NAUSEA AND VOMITING,                            OTHER (SEE COMMENTS)    Comment:Stomach aches    Objective:   Physical Exam  Vitals reviewed. Constitutional:       Appearance: Normal appearance. She is well-developed. HENT:      Head: Normocephalic and atraumatic. Right Ear: Tympanic membrane, ear canal and external ear normal.      Left Ear: Tympanic membrane, ear canal and external ear normal.      Mouth/Throat:      Pharynx: No posterior oropharyngeal erythema. Eyes:      Conjunctiva/sclera: Conjunctivae normal.      Pupils: Pupils are equal, round, and reactive to light. Cardiovascular:      Rate and Rhythm: Normal rate and regular rhythm. Heart sounds: Normal heart sounds. Pulmonary:      Effort: Pulmonary effort is normal.      Breath sounds: Normal breath sounds. Skin:     General: Skin is warm and dry.    Neurological:      Mental Status: She is alert. Psychiatric:         Behavior: Behavior normal.         Assessment & Plan:   Annual physical exam  (primary encounter diagnosis)  Chronic fatigue  Iron deficiency  Reviewed age-appropriate preventive health and safety recommendations with patient. Check labs. Encouraged regular exercise and healthy eating. Check labs. Continue f/u with functional medicine practitioner. Encouraged exercise. Check iron. Can likely switch to every other day.    Orders Placed This Encounter      TSH W Reflex To Free T4      Magnesium [E]      Vitamin B12 [E]      Vitamin D [E]      Meds This Visit:  Requested Prescriptions      No prescriptions requested or ordered in this encounter       Imaging & Referrals:  None

## 2023-07-06 ENCOUNTER — LAB ENCOUNTER (OUTPATIENT)
Dept: LAB | Age: 48
End: 2023-07-06
Attending: FAMILY MEDICINE
Payer: COMMERCIAL

## 2023-07-06 DIAGNOSIS — R53.82 CHRONIC FATIGUE: ICD-10-CM

## 2023-07-06 DIAGNOSIS — Z00.00 ROUTINE GENERAL MEDICAL EXAMINATION AT A HEALTH CARE FACILITY: ICD-10-CM

## 2023-07-06 DIAGNOSIS — Z13.220 SCREENING FOR LIPID DISORDERS: ICD-10-CM

## 2023-07-06 DIAGNOSIS — Z13.228 SCREENING FOR METABOLIC DISORDER: ICD-10-CM

## 2023-07-06 DIAGNOSIS — Z13.0 SCREENING FOR DISORDER OF BLOOD AND BLOOD-FORMING ORGANS: ICD-10-CM

## 2023-07-06 DIAGNOSIS — R79.0 LOW FERRITIN: ICD-10-CM

## 2023-07-06 LAB
ALBUMIN SERPL-MCNC: 3.9 G/DL (ref 3.4–5)
ALBUMIN/GLOB SERPL: 0.9 {RATIO} (ref 1–2)
ALP LIVER SERPL-CCNC: 68 U/L
ALT SERPL-CCNC: 23 U/L
ANION GAP SERPL CALC-SCNC: 3 MMOL/L (ref 0–18)
AST SERPL-CCNC: 16 U/L (ref 15–37)
BASOPHILS # BLD AUTO: 0.07 X10(3) UL (ref 0–0.2)
BASOPHILS NFR BLD AUTO: 0.9 %
BILIRUB SERPL-MCNC: 0.4 MG/DL (ref 0.1–2)
BUN BLD-MCNC: 7 MG/DL (ref 7–18)
CALCIUM BLD-MCNC: 9.2 MG/DL (ref 8.5–10.1)
CHLORIDE SERPL-SCNC: 106 MMOL/L (ref 98–112)
CHOLEST SERPL-MCNC: 175 MG/DL (ref ?–200)
CO2 SERPL-SCNC: 27 MMOL/L (ref 21–32)
CREAT BLD-MCNC: 0.64 MG/DL
DEPRECATED HBV CORE AB SER IA-ACNC: 34.3 NG/ML
EOSINOPHIL # BLD AUTO: 0.15 X10(3) UL (ref 0–0.7)
EOSINOPHIL NFR BLD AUTO: 1.9 %
ERYTHROCYTE [DISTWIDTH] IN BLOOD BY AUTOMATED COUNT: 13.1 %
FASTING PATIENT LIPID ANSWER: YES
FASTING STATUS PATIENT QL REPORTED: YES
GFR SERPLBLD BASED ON 1.73 SQ M-ARVRAT: 110 ML/MIN/1.73M2 (ref 60–?)
GLOBULIN PLAS-MCNC: 4.3 G/DL (ref 2.8–4.4)
GLUCOSE BLD-MCNC: 96 MG/DL (ref 70–99)
HCT VFR BLD AUTO: 41.7 %
HDLC SERPL-MCNC: 69 MG/DL (ref 40–59)
HGB BLD-MCNC: 13.5 G/DL
IMM GRANULOCYTES # BLD AUTO: 0.03 X10(3) UL (ref 0–1)
IMM GRANULOCYTES NFR BLD: 0.4 %
IRON SATN MFR SERPL: 27 %
IRON SERPL-MCNC: 106 UG/DL
LDLC SERPL CALC-MCNC: 88 MG/DL (ref ?–100)
LYMPHOCYTES # BLD AUTO: 2.75 X10(3) UL (ref 1–4)
LYMPHOCYTES NFR BLD AUTO: 35.6 %
MAGNESIUM SERPL-MCNC: 2.3 MG/DL (ref 1.6–2.6)
MCH RBC QN AUTO: 29.4 PG (ref 26–34)
MCHC RBC AUTO-ENTMCNC: 32.4 G/DL (ref 31–37)
MCV RBC AUTO: 90.8 FL
MONOCYTES # BLD AUTO: 0.5 X10(3) UL (ref 0.1–1)
MONOCYTES NFR BLD AUTO: 6.5 %
NEUTROPHILS # BLD AUTO: 4.22 X10 (3) UL (ref 1.5–7.7)
NEUTROPHILS # BLD AUTO: 4.22 X10(3) UL (ref 1.5–7.7)
NEUTROPHILS NFR BLD AUTO: 54.7 %
NONHDLC SERPL-MCNC: 106 MG/DL (ref ?–130)
OSMOLALITY SERPL CALC.SUM OF ELEC: 280 MOSM/KG (ref 275–295)
PLATELET # BLD AUTO: 248 10(3)UL (ref 150–450)
POTASSIUM SERPL-SCNC: 4.7 MMOL/L (ref 3.5–5.1)
PROT SERPL-MCNC: 8.2 G/DL (ref 6.4–8.2)
RBC # BLD AUTO: 4.59 X10(6)UL
SODIUM SERPL-SCNC: 136 MMOL/L (ref 136–145)
TIBC SERPL-MCNC: 386 UG/DL (ref 240–450)
TRANSFERRIN SERPL-MCNC: 259 MG/DL (ref 200–360)
TRIGL SERPL-MCNC: 102 MG/DL (ref 30–149)
TSI SER-ACNC: 2.43 MIU/ML (ref 0.36–3.74)
VIT B12 SERPL-MCNC: 406 PG/ML (ref 193–986)
VIT D+METAB SERPL-MCNC: 47.6 NG/ML (ref 30–100)
VLDLC SERPL CALC-MCNC: 16 MG/DL (ref 0–30)
WBC # BLD AUTO: 7.7 X10(3) UL (ref 4–11)

## 2023-07-06 PROCEDURE — 83550 IRON BINDING TEST: CPT

## 2023-07-06 PROCEDURE — 82306 VITAMIN D 25 HYDROXY: CPT

## 2023-07-06 PROCEDURE — 82728 ASSAY OF FERRITIN: CPT

## 2023-07-06 PROCEDURE — 84443 ASSAY THYROID STIM HORMONE: CPT

## 2023-07-06 PROCEDURE — 36415 COLL VENOUS BLD VENIPUNCTURE: CPT

## 2023-07-06 PROCEDURE — 83735 ASSAY OF MAGNESIUM: CPT

## 2023-07-06 PROCEDURE — 82607 VITAMIN B-12: CPT

## 2023-07-06 PROCEDURE — 85025 COMPLETE CBC W/AUTO DIFF WBC: CPT

## 2023-07-06 PROCEDURE — 80053 COMPREHEN METABOLIC PANEL: CPT

## 2023-07-06 PROCEDURE — 80061 LIPID PANEL: CPT

## 2023-07-06 PROCEDURE — 83540 ASSAY OF IRON: CPT

## 2023-12-14 ENCOUNTER — TELEPHONE (OUTPATIENT)
Facility: CLINIC | Age: 48
End: 2023-12-14

## 2023-12-14 DIAGNOSIS — Z12.31 BREAST CANCER SCREENING BY MAMMOGRAM: Primary | ICD-10-CM

## 2023-12-14 NOTE — TELEPHONE ENCOUNTER
Pt calling to state she needs a new mammogram order and she insist on getting it done by the end of the year. Please advise.

## 2023-12-18 NOTE — TELEPHONE ENCOUNTER
Message left per HIPAA form letting pt know her mammogram order has been placed. To call with any questions.

## 2023-12-22 PROBLEM — Z12.11 SPECIAL SCREENING FOR MALIGNANT NEOPLASM OF COLON: Status: ACTIVE | Noted: 2023-12-22

## 2023-12-26 ENCOUNTER — TELEPHONE (OUTPATIENT)
Facility: CLINIC | Age: 48
End: 2023-12-26

## 2023-12-26 DIAGNOSIS — R92.8 ABNORMAL MAMMOGRAM: Primary | ICD-10-CM

## 2023-12-26 NOTE — TELEPHONE ENCOUNTER
Called radiology, notable to HENRY Aguayo to verify what diagnosis should be used for the requested order.

## 2023-12-26 NOTE — TELEPHONE ENCOUNTER
Elenita Vázquez w/ mammography called requesting a new order that is being recommended by the radiologist for a Bilateral mammogram w/US

## 2023-12-27 NOTE — TELEPHONE ENCOUNTER
Spoke with Molly Davey in mammography. 6 month dx mammogram with left breast ultrasound was recommended 9/2022. Pt did not have it done. They are requesting a bilateral dx mammogram and ultrasound. Dx- abnormal mammogram. Order pended.

## 2024-01-19 ENCOUNTER — HOSPITAL ENCOUNTER (OUTPATIENT)
Dept: MAMMOGRAPHY | Facility: HOSPITAL | Age: 49
Discharge: HOME | End: 2024-01-19
Attending: OBSTETRICS & GYNECOLOGY
Payer: COMMERCIAL

## 2024-01-19 DIAGNOSIS — R92.8 ABNORMAL MAMMOGRAM: ICD-10-CM

## 2024-01-19 PROCEDURE — 76642 ULTRASOUND BREAST LIMITED: CPT | Performed by: OBSTETRICS & GYNECOLOGY

## 2024-01-19 PROCEDURE — 77062 BREAST TOMOSYNTHESIS BI: CPT | Performed by: OBSTETRICS & GYNECOLOGY

## 2024-01-19 PROCEDURE — 77066 DX MAMMO INCL CAD BI: CPT | Performed by: OBSTETRICS & GYNECOLOGY

## 2024-01-24 DIAGNOSIS — N63.0 BREAST NODULE: Primary | ICD-10-CM

## 2024-04-04 ENCOUNTER — OFFICE VISIT (OUTPATIENT)
Facility: CLINIC | Age: 49
End: 2024-04-04
Payer: COMMERCIAL

## 2024-04-04 VITALS
HEART RATE: 71 BPM | WEIGHT: 170 LBS | BODY MASS INDEX: 28.32 KG/M2 | HEIGHT: 65 IN | DIASTOLIC BLOOD PRESSURE: 70 MMHG | SYSTOLIC BLOOD PRESSURE: 128 MMHG

## 2024-04-04 DIAGNOSIS — Z01.419 WELL WOMAN EXAM WITH ROUTINE GYNECOLOGICAL EXAM: Primary | ICD-10-CM

## 2024-04-04 PROCEDURE — 99396 PREV VISIT EST AGE 40-64: CPT | Performed by: OBSTETRICS & GYNECOLOGY

## 2024-04-04 RX ORDER — BIOTIN 5 MG
1 TABLET ORAL
COMMUNITY

## 2024-04-04 NOTE — PROGRESS NOTES
Ann Tiwari is a 49 year old female  Patient's last menstrual period was 2024.   Chief Complaint   Patient presents with    Wellness Visit     LMP 3/19/24, no OCP   .     She states that she has been treated for fibromyalgia and fatigue.  She wishes to have blood work done.  She is not taking vitamin D at this time.  She says she has night sweats but so does her .  Periods are regular every month but they vary in length and are usually 3 to 5 days.  She has not skipped a period.  She had a colonoscopy with a large polyp removed she has to go back in 3 years.  She is doing mammograms every 6 months.  She also complains of urgency incontinence but does not want to take any medicine for it at this time    OBSTETRICS HISTORY:  OB History    Para Term  AB Living   1 0 0 0 1 0   SAB IAB Ectopic Multiple Live Births   1 0 0 0 0      # Outcome Date GA Lbr Aldo/2nd Weight Sex Delivery Anes PTL Lv   1 SAB  10w0d              GYNE HISTORY:  Periods regular monthly    History   Sexual Activity    Sexual activity: Not Currently        Hx Prior Abnormal Pap: No  Pap Date: 22  Pap Result Notes: neg/neg hpv        MEDICAL HISTORY:  Past Medical History:   Diagnosis Date    Fatigue     Sleep apnea     Stress        SURGICAL HISTORY:  Past Surgical History:   Procedure Laterality Date    Insert intrauterine device  2019    Copper IUD inserted - Dr. Sarah Tolentino     Remove intrauterine device  2019    C/o spotting, cramping, acne. IUD removed less than 1 month from placement - Dr. Tolentino     [place] teeth removed         SOCIAL HISTORY:  Social History     Socioeconomic History    Marital status:      Spouse name: Not on file    Number of children: Not on file    Years of education: Not on file    Highest education level: Not on file   Occupational History    Not on file   Tobacco Use    Smoking status: Never    Smokeless tobacco: Never   Vaping Use    Vaping Use: Never used    Substance and Sexual Activity    Alcohol use: No    Drug use: No    Sexual activity: Not Currently   Other Topics Concern     Service Not Asked    Blood Transfusions Not Asked    Caffeine Concern No     Comment: 2 cups coffee 1 cup tea daily    Occupational Exposure Not Asked    Hobby Hazards Not Asked    Sleep Concern Not Asked    Stress Concern Not Asked    Weight Concern Not Asked    Special Diet Not Asked    Back Care Not Asked    Exercise Yes     Comment: 3-4 d/wk    Bike Helmet Not Asked    Seat Belt Yes    Self-Exams Not Asked   Social History Narrative    Not on file     Social Determinants of Health     Financial Resource Strain: Not on file   Food Insecurity: Not on file   Transportation Needs: Not on file   Physical Activity: Not on file   Stress: Not on file   Social Connections: Not on file   Housing Stability: Not on file       FAMILY HISTORY:  Family History   Problem Relation Age of Onset    Heart Attack Father     Prostate Cancer Father 73    Heart Disorder Father     Heart Surgery Father         stents in  heart    Cancer Mother 78        stomach cancer    Heart Attack Mother     Hypertension Mother     Diabetes Mother     Pacemaker Mother     No Known Problems Maternal Grandmother     No Known Problems Maternal Grandfather     No Known Problems Paternal Grandmother     No Known Problems Paternal Grandfather     Colon Cancer Neg     Pancreatic Cancer Neg     Uterine Cancer Neg     Ovarian Cancer Neg     Breast Cancer Neg     Endometriosis Neg     Infertility Neg        MEDICATIONS:    Current Outpatient Medications:     Cyanocobalamin (RAPID B-12 ENERGY) 200 MCG/SPRAY Oral Liquid, Take 1 spray by mouth 3 (three) times a week at 1600., Disp: , Rfl:     Multiple Vitamins-Minerals (MULTIVITAMIN ADULT OR), Take by mouth., Disp: , Rfl:     ergocalciferol 1.25 MG (34839 UT) Oral Cap, Take by mouth every 7 days. (Patient not taking: Reported on 4/4/2024), Disp: , Rfl:     ALLERGIES:    Allergies    Allergen Reactions    Egg DIARRHEA, NAUSEA AND VOMITING and OTHER (SEE COMMENTS)     Stomach aches    Mushroom Extract Complex DIARRHEA, NAUSEA AND VOMITING and OTHER (SEE COMMENTS)     Stomach aches         Review of Systems:  Constitutional:  Denies fatigue, night sweats, hot flashes  Gastrointestinal:  denies heartburn, abdominal pain, diarrhea or constipation  Genitourinary:  denies dysuria, incontinence, abnormal vaginal discharge, vaginal itching  Skin/Breast:  Denies any breast pain, lumps, or discharge.   Neurological:  denies headaches, extremity weakness or numbness.      PHYSICAL EXAM:   Constitutional: well developed, well nourished  Head/Face: normocephalic  Neck/Thyroid: thyroid symmetric, no thyromegaly, no nodules, no adenopathy  Breast: normal without palpable masses, tenderness, asymmetry, nipple discharge, nipple retraction or skin changes  Abdomen:  soft, nontender, nondistended, no masses  Skin/Hair: no unusual rashes or bruises   Extremities: no edema, no cyanosis  Psychiatric:  Oriented to time, place, person and situation. Appropriate mood and affect    Pelvic Exam:  External Genitalia: normal appearance, hair distribution, and no lesions  Urethral Meatus:  normal in size, location, without lesions and prolapse  Bladder:  No fullness, masses or tenderness  Vagina:  Normal appearance without lesions, no abnormal discharge  Cervix:  Normal without tenderness on motion without lesions.  Uterus: normal in size, contour, position, mobility, without tenderness  Adnexa: normal without masses or tenderness  Perineum: normal  Anus: no hemorroids     Assessment & Plan:  Ann was seen today for wellness visit.    Diagnoses and all orders for this visit:    Well woman exam with routine gynecological exam        Urgency incontinence.

## 2024-07-23 ENCOUNTER — HOSPITAL ENCOUNTER (OUTPATIENT)
Facility: HOSPITAL | Age: 49
Setting detail: OBSERVATION
Discharge: HOME | End: 2024-07-24
Attending: EMERGENCY MEDICINE | Admitting: STUDENT IN AN ORGANIZED HEALTH CARE EDUCATION/TRAINING PROGRAM
Payer: COMMERCIAL

## 2024-07-23 ENCOUNTER — APPOINTMENT (OUTPATIENT)
Dept: CT IMAGING | Facility: HOSPITAL | Age: 49
End: 2024-07-23
Attending: EMERGENCY MEDICINE
Payer: COMMERCIAL

## 2024-07-23 ENCOUNTER — E-VISIT (OUTPATIENT)
Dept: TELEHEALTH | Age: 49
End: 2024-07-23
Payer: COMMERCIAL

## 2024-07-23 DIAGNOSIS — K61.0 PERIANAL ABSCESS: Primary | ICD-10-CM

## 2024-07-23 DIAGNOSIS — L02.91 ABSCESS: ICD-10-CM

## 2024-07-23 DIAGNOSIS — G89.18 POST-OPERATIVE PAIN: ICD-10-CM

## 2024-07-23 DIAGNOSIS — R19.7 DIARRHEA, UNSPECIFIED TYPE: ICD-10-CM

## 2024-07-23 DIAGNOSIS — K59.00 CONSTIPATION, UNSPECIFIED CONSTIPATION TYPE: ICD-10-CM

## 2024-07-23 DIAGNOSIS — K62.89 RECTAL PAIN: Primary | ICD-10-CM

## 2024-07-23 PROBLEM — E87.1 HYPONATREMIA: Status: ACTIVE | Noted: 2024-07-23

## 2024-07-23 LAB
ALBUMIN SERPL-MCNC: 4.7 G/DL (ref 3.2–4.8)
ALBUMIN/GLOB SERPL: 1.5 {RATIO} (ref 1–2)
ALP LIVER SERPL-CCNC: 72 U/L
ALT SERPL-CCNC: 11 U/L
ANION GAP SERPL CALC-SCNC: 5 MMOL/L (ref 0–18)
AST SERPL-CCNC: 15 U/L (ref ?–34)
BASOPHILS # BLD AUTO: 0.08 X10(3) UL (ref 0–0.2)
BASOPHILS NFR BLD AUTO: 0.6 %
BILIRUB SERPL-MCNC: 0.3 MG/DL (ref 0.3–1.2)
BUN BLD-MCNC: 8 MG/DL (ref 9–23)
CALCIUM BLD-MCNC: 9.4 MG/DL (ref 8.7–10.4)
CHLORIDE SERPL-SCNC: 103 MMOL/L (ref 98–112)
CO2 SERPL-SCNC: 27 MMOL/L (ref 21–32)
CREAT BLD-MCNC: 0.67 MG/DL
EGFRCR SERPLBLD CKD-EPI 2021: 108 ML/MIN/1.73M2 (ref 60–?)
EOSINOPHIL # BLD AUTO: 0.07 X10(3) UL (ref 0–0.7)
EOSINOPHIL NFR BLD AUTO: 0.6 %
ERYTHROCYTE [DISTWIDTH] IN BLOOD BY AUTOMATED COUNT: 12.7 %
GLOBULIN PLAS-MCNC: 3.2 G/DL (ref 2.8–4.4)
GLUCOSE BLD-MCNC: 119 MG/DL (ref 70–99)
HCT VFR BLD AUTO: 36.6 %
HGB BLD-MCNC: 12.4 G/DL
IMM GRANULOCYTES # BLD AUTO: 0.04 X10(3) UL (ref 0–1)
IMM GRANULOCYTES NFR BLD: 0.3 %
LYMPHOCYTES # BLD AUTO: 2.56 X10(3) UL (ref 1–4)
LYMPHOCYTES NFR BLD AUTO: 20.5 %
MCH RBC QN AUTO: 29.1 PG (ref 26–34)
MCHC RBC AUTO-ENTMCNC: 33.9 G/DL (ref 31–37)
MCV RBC AUTO: 85.9 FL
MONOCYTES # BLD AUTO: 0.81 X10(3) UL (ref 0.1–1)
MONOCYTES NFR BLD AUTO: 6.5 %
NEUTROPHILS # BLD AUTO: 8.92 X10 (3) UL (ref 1.5–7.7)
NEUTROPHILS # BLD AUTO: 8.92 X10(3) UL (ref 1.5–7.7)
NEUTROPHILS NFR BLD AUTO: 71.5 %
OSMOLALITY SERPL CALC.SUM OF ELEC: 279 MOSM/KG (ref 275–295)
PLATELET # BLD AUTO: 261 10(3)UL (ref 150–450)
POTASSIUM SERPL-SCNC: 3.7 MMOL/L (ref 3.5–5.1)
PROT SERPL-MCNC: 7.9 G/DL (ref 5.7–8.2)
RBC # BLD AUTO: 4.26 X10(6)UL
SODIUM SERPL-SCNC: 135 MMOL/L (ref 136–145)
WBC # BLD AUTO: 12.5 X10(3) UL (ref 4–11)

## 2024-07-23 PROCEDURE — 99421 OL DIG E/M SVC 5-10 MIN: CPT | Performed by: NURSE PRACTITIONER

## 2024-07-23 PROCEDURE — 74177 CT ABD & PELVIS W/CONTRAST: CPT | Performed by: EMERGENCY MEDICINE

## 2024-07-24 ENCOUNTER — ANESTHESIA EVENT (OUTPATIENT)
Dept: SURGERY | Facility: HOSPITAL | Age: 49
End: 2024-07-24
Payer: COMMERCIAL

## 2024-07-24 ENCOUNTER — ANESTHESIA (OUTPATIENT)
Dept: SURGERY | Facility: HOSPITAL | Age: 49
End: 2024-07-24
Payer: COMMERCIAL

## 2024-07-24 VITALS
TEMPERATURE: 98 F | RESPIRATION RATE: 18 BRPM | SYSTOLIC BLOOD PRESSURE: 120 MMHG | WEIGHT: 162 LBS | DIASTOLIC BLOOD PRESSURE: 70 MMHG | HEART RATE: 84 BPM | BODY MASS INDEX: 27 KG/M2 | OXYGEN SATURATION: 97 %

## 2024-07-24 PROBLEM — Z86.0100 HISTORY OF COLONIC POLYPS: Status: ACTIVE | Noted: 2024-07-24

## 2024-07-24 PROBLEM — Z86.010 HISTORY OF COLONIC POLYPS: Status: ACTIVE | Noted: 2024-07-24

## 2024-07-24 PROBLEM — D72.829 LEUKOCYTOSIS: Status: ACTIVE | Noted: 2024-07-24

## 2024-07-24 LAB
ERYTHROCYTE [DISTWIDTH] IN BLOOD BY AUTOMATED COUNT: 12.9 %
HCG UR QL: NEGATIVE
HCT VFR BLD AUTO: 32.3 %
HGB BLD-MCNC: 10.4 G/DL
LACTATE SERPL-SCNC: 1 MMOL/L (ref 0.5–2)
MCH RBC QN AUTO: 28.7 PG (ref 26–34)
MCHC RBC AUTO-ENTMCNC: 32.2 G/DL (ref 31–37)
MCV RBC AUTO: 89.2 FL
PLATELET # BLD AUTO: 212 10(3)UL (ref 150–450)
RBC # BLD AUTO: 3.62 X10(6)UL
WBC # BLD AUTO: 11.4 X10(3) UL (ref 4–11)

## 2024-07-24 PROCEDURE — 99221 1ST HOSP IP/OBS SF/LOW 40: CPT | Performed by: SURGERY

## 2024-07-24 PROCEDURE — 46040 I&D ISCHIORCT&/PERIRCT ABSC: CPT | Performed by: SURGERY

## 2024-07-24 PROCEDURE — 0D9Q0ZZ DRAINAGE OF ANUS, OPEN APPROACH: ICD-10-PCS | Performed by: SURGERY

## 2024-07-24 PROCEDURE — 0DJD8ZZ INSPECTION OF LOWER INTESTINAL TRACT, VIA NATURAL OR ARTIFICIAL OPENING ENDOSCOPIC: ICD-10-PCS | Performed by: SURGERY

## 2024-07-24 RX ORDER — ACETAMINOPHEN 500 MG
1000 TABLET ORAL ONCE
Status: COMPLETED | OUTPATIENT
Start: 2024-07-24 | End: 2024-07-24

## 2024-07-24 RX ORDER — DEXAMETHASONE SODIUM PHOSPHATE 4 MG/ML
VIAL (ML) INJECTION AS NEEDED
Status: DISCONTINUED | OUTPATIENT
Start: 2024-07-24 | End: 2024-07-24 | Stop reason: SURG

## 2024-07-24 RX ORDER — ONDANSETRON 2 MG/ML
INJECTION INTRAMUSCULAR; INTRAVENOUS
Status: COMPLETED
Start: 2024-07-24 | End: 2024-07-24

## 2024-07-24 RX ORDER — PHENYLEPHRINE HCL 10 MG/ML
VIAL (ML) INJECTION AS NEEDED
Status: DISCONTINUED | OUTPATIENT
Start: 2024-07-24 | End: 2024-07-24 | Stop reason: SURG

## 2024-07-24 RX ORDER — HYDROCODONE BITARTRATE AND ACETAMINOPHEN 5; 325 MG/1; MG/1
1 TABLET ORAL EVERY 6 HOURS PRN
Qty: 20 TABLET | Refills: 0 | Status: SHIPPED | OUTPATIENT
Start: 2024-07-24

## 2024-07-24 RX ORDER — HYDROMORPHONE HYDROCHLORIDE 1 MG/ML
0.6 INJECTION, SOLUTION INTRAMUSCULAR; INTRAVENOUS; SUBCUTANEOUS EVERY 5 MIN PRN
Status: DISCONTINUED | OUTPATIENT
Start: 2024-07-24 | End: 2024-07-24 | Stop reason: HOSPADM

## 2024-07-24 RX ORDER — HYDROCODONE BITARTRATE AND ACETAMINOPHEN 5; 325 MG/1; MG/1
1 TABLET ORAL EVERY 4 HOURS PRN
Status: DISCONTINUED | OUTPATIENT
Start: 2024-07-24 | End: 2024-07-24

## 2024-07-24 RX ORDER — HYDROMORPHONE HYDROCHLORIDE 1 MG/ML
0.8 INJECTION, SOLUTION INTRAMUSCULAR; INTRAVENOUS; SUBCUTANEOUS EVERY 2 HOUR PRN
Status: DISCONTINUED | OUTPATIENT
Start: 2024-07-24 | End: 2024-07-24

## 2024-07-24 RX ORDER — ONDANSETRON 2 MG/ML
INJECTION INTRAMUSCULAR; INTRAVENOUS AS NEEDED
Status: DISCONTINUED | OUTPATIENT
Start: 2024-07-24 | End: 2024-07-24 | Stop reason: SURG

## 2024-07-24 RX ORDER — ACETAMINOPHEN 500 MG
1000 TABLET ORAL ONCE AS NEEDED
Status: DISCONTINUED | OUTPATIENT
Start: 2024-07-24 | End: 2024-07-24 | Stop reason: HOSPADM

## 2024-07-24 RX ORDER — CEFAZOLIN SODIUM 1 G/3ML
INJECTION, POWDER, FOR SOLUTION INTRAMUSCULAR; INTRAVENOUS AS NEEDED
Status: DISCONTINUED | OUTPATIENT
Start: 2024-07-24 | End: 2024-07-24 | Stop reason: SURG

## 2024-07-24 RX ORDER — LABETALOL HYDROCHLORIDE 5 MG/ML
5 INJECTION, SOLUTION INTRAVENOUS EVERY 5 MIN PRN
Status: DISCONTINUED | OUTPATIENT
Start: 2024-07-24 | End: 2024-07-24 | Stop reason: HOSPADM

## 2024-07-24 RX ORDER — DIPHENHYDRAMINE HYDROCHLORIDE 50 MG/ML
12.5 INJECTION INTRAMUSCULAR; INTRAVENOUS AS NEEDED
Status: DISCONTINUED | OUTPATIENT
Start: 2024-07-24 | End: 2024-07-24 | Stop reason: HOSPADM

## 2024-07-24 RX ORDER — PROCHLORPERAZINE EDISYLATE 5 MG/ML
5 INJECTION INTRAMUSCULAR; INTRAVENOUS EVERY 8 HOURS PRN
Status: DISCONTINUED | OUTPATIENT
Start: 2024-07-24 | End: 2024-07-24 | Stop reason: HOSPADM

## 2024-07-24 RX ORDER — NALOXONE HYDROCHLORIDE 0.4 MG/ML
0.08 INJECTION, SOLUTION INTRAMUSCULAR; INTRAVENOUS; SUBCUTANEOUS AS NEEDED
Status: DISCONTINUED | OUTPATIENT
Start: 2024-07-24 | End: 2024-07-24 | Stop reason: HOSPADM

## 2024-07-24 RX ORDER — HYDROMORPHONE HYDROCHLORIDE 1 MG/ML
0.2 INJECTION, SOLUTION INTRAMUSCULAR; INTRAVENOUS; SUBCUTANEOUS EVERY 2 HOUR PRN
Status: DISCONTINUED | OUTPATIENT
Start: 2024-07-24 | End: 2024-07-24

## 2024-07-24 RX ORDER — HYDROMORPHONE HYDROCHLORIDE 1 MG/ML
0.2 INJECTION, SOLUTION INTRAMUSCULAR; INTRAVENOUS; SUBCUTANEOUS EVERY 5 MIN PRN
Status: DISCONTINUED | OUTPATIENT
Start: 2024-07-24 | End: 2024-07-24 | Stop reason: HOSPADM

## 2024-07-24 RX ORDER — METRONIDAZOLE 500 MG/100ML
INJECTION, SOLUTION INTRAVENOUS AS NEEDED
Status: DISCONTINUED | OUTPATIENT
Start: 2024-07-24 | End: 2024-07-24 | Stop reason: SURG

## 2024-07-24 RX ORDER — AMOXICILLIN AND CLAVULANATE POTASSIUM 875; 125 MG/1; MG/1
1 TABLET, FILM COATED ORAL 2 TIMES DAILY
Qty: 10 TABLET | Refills: 0 | Status: SHIPPED | OUTPATIENT
Start: 2024-07-24 | End: 2024-07-29

## 2024-07-24 RX ORDER — SODIUM CHLORIDE 9 MG/ML
INJECTION, SOLUTION INTRAVENOUS CONTINUOUS
Status: ACTIVE | OUTPATIENT
Start: 2024-07-24 | End: 2024-07-24

## 2024-07-24 RX ORDER — ENOXAPARIN SODIUM 100 MG/ML
40 INJECTION SUBCUTANEOUS DAILY
Status: DISCONTINUED | OUTPATIENT
Start: 2024-07-24 | End: 2024-07-24

## 2024-07-24 RX ORDER — MEPERIDINE HYDROCHLORIDE 25 MG/ML
12.5 INJECTION INTRAMUSCULAR; INTRAVENOUS; SUBCUTANEOUS AS NEEDED
Status: DISCONTINUED | OUTPATIENT
Start: 2024-07-24 | End: 2024-07-24 | Stop reason: HOSPADM

## 2024-07-24 RX ORDER — ROCURONIUM BROMIDE 10 MG/ML
INJECTION, SOLUTION INTRAVENOUS AS NEEDED
Status: DISCONTINUED | OUTPATIENT
Start: 2024-07-24 | End: 2024-07-24 | Stop reason: SURG

## 2024-07-24 RX ORDER — HYDROCODONE BITARTRATE AND ACETAMINOPHEN 5; 325 MG/1; MG/1
2 TABLET ORAL ONCE AS NEEDED
Status: DISCONTINUED | OUTPATIENT
Start: 2024-07-24 | End: 2024-07-24 | Stop reason: HOSPADM

## 2024-07-24 RX ORDER — ONDANSETRON 2 MG/ML
4 INJECTION INTRAMUSCULAR; INTRAVENOUS EVERY 4 HOURS PRN
Status: ACTIVE | OUTPATIENT
Start: 2024-07-24 | End: 2024-07-24

## 2024-07-24 RX ORDER — ACETAMINOPHEN 325 MG/1
650 TABLET ORAL EVERY 4 HOURS PRN
Status: DISCONTINUED | OUTPATIENT
Start: 2024-07-24 | End: 2024-07-24

## 2024-07-24 RX ORDER — HYDROCODONE BITARTRATE AND ACETAMINOPHEN 5; 325 MG/1; MG/1
2 TABLET ORAL EVERY 4 HOURS PRN
Status: DISCONTINUED | OUTPATIENT
Start: 2024-07-24 | End: 2024-07-24

## 2024-07-24 RX ORDER — MEPERIDINE HYDROCHLORIDE 25 MG/ML
INJECTION INTRAMUSCULAR; INTRAVENOUS; SUBCUTANEOUS
Status: COMPLETED
Start: 2024-07-24 | End: 2024-07-24

## 2024-07-24 RX ORDER — HYDROMORPHONE HYDROCHLORIDE 1 MG/ML
0.4 INJECTION, SOLUTION INTRAMUSCULAR; INTRAVENOUS; SUBCUTANEOUS EVERY 5 MIN PRN
Status: DISCONTINUED | OUTPATIENT
Start: 2024-07-24 | End: 2024-07-24 | Stop reason: HOSPADM

## 2024-07-24 RX ORDER — LIDOCAINE HYDROCHLORIDE AND EPINEPHRINE 10; 10 MG/ML; UG/ML
INJECTION, SOLUTION INFILTRATION; PERINEURAL AS NEEDED
Status: DISCONTINUED | OUTPATIENT
Start: 2024-07-24 | End: 2024-07-24 | Stop reason: HOSPADM

## 2024-07-24 RX ORDER — SODIUM CHLORIDE, SODIUM LACTATE, POTASSIUM CHLORIDE, CALCIUM CHLORIDE 600; 310; 30; 20 MG/100ML; MG/100ML; MG/100ML; MG/100ML
INJECTION, SOLUTION INTRAVENOUS CONTINUOUS
Status: DISCONTINUED | OUTPATIENT
Start: 2024-07-24 | End: 2024-07-24 | Stop reason: HOSPADM

## 2024-07-24 RX ORDER — ACETAMINOPHEN 500 MG
TABLET ORAL
Status: COMPLETED
Start: 2024-07-24 | End: 2024-07-24

## 2024-07-24 RX ORDER — MIDAZOLAM HYDROCHLORIDE 1 MG/ML
1 INJECTION INTRAMUSCULAR; INTRAVENOUS EVERY 5 MIN PRN
Status: DISCONTINUED | OUTPATIENT
Start: 2024-07-24 | End: 2024-07-24 | Stop reason: HOSPADM

## 2024-07-24 RX ORDER — ONDANSETRON 2 MG/ML
4 INJECTION INTRAMUSCULAR; INTRAVENOUS EVERY 6 HOURS PRN
Status: DISCONTINUED | OUTPATIENT
Start: 2024-07-24 | End: 2024-07-24 | Stop reason: HOSPADM

## 2024-07-24 RX ORDER — HYDROMORPHONE HYDROCHLORIDE 1 MG/ML
0.4 INJECTION, SOLUTION INTRAMUSCULAR; INTRAVENOUS; SUBCUTANEOUS EVERY 2 HOUR PRN
Status: DISCONTINUED | OUTPATIENT
Start: 2024-07-24 | End: 2024-07-24

## 2024-07-24 RX ORDER — HYDROMORPHONE HYDROCHLORIDE 1 MG/ML
0.5 INJECTION, SOLUTION INTRAMUSCULAR; INTRAVENOUS; SUBCUTANEOUS EVERY 30 MIN PRN
Status: ACTIVE | OUTPATIENT
Start: 2024-07-24 | End: 2024-07-24

## 2024-07-24 RX ORDER — HYDROCODONE BITARTRATE AND ACETAMINOPHEN 5; 325 MG/1; MG/1
1 TABLET ORAL ONCE AS NEEDED
Status: DISCONTINUED | OUTPATIENT
Start: 2024-07-24 | End: 2024-07-24 | Stop reason: HOSPADM

## 2024-07-24 RX ORDER — SODIUM CHLORIDE, SODIUM LACTATE, POTASSIUM CHLORIDE, CALCIUM CHLORIDE 600; 310; 30; 20 MG/100ML; MG/100ML; MG/100ML; MG/100ML
INJECTION, SOLUTION INTRAVENOUS CONTINUOUS PRN
Status: DISCONTINUED | OUTPATIENT
Start: 2024-07-24 | End: 2024-07-24 | Stop reason: SURG

## 2024-07-24 RX ORDER — MIDAZOLAM HYDROCHLORIDE 1 MG/ML
INJECTION INTRAMUSCULAR; INTRAVENOUS AS NEEDED
Status: DISCONTINUED | OUTPATIENT
Start: 2024-07-24 | End: 2024-07-24 | Stop reason: SURG

## 2024-07-24 RX ADMIN — SODIUM CHLORIDE, SODIUM LACTATE, POTASSIUM CHLORIDE, CALCIUM CHLORIDE: 600; 310; 30; 20 INJECTION, SOLUTION INTRAVENOUS at 13:24:00

## 2024-07-24 RX ADMIN — ROCURONIUM BROMIDE 10 MG: 10 INJECTION, SOLUTION INTRAVENOUS at 14:03:00

## 2024-07-24 RX ADMIN — DEXAMETHASONE SODIUM PHOSPHATE 4 MG: 4 MG/ML VIAL (ML) INJECTION at 13:35:00

## 2024-07-24 RX ADMIN — METRONIDAZOLE 500 MG: 500 INJECTION, SOLUTION INTRAVENOUS at 13:58:00

## 2024-07-24 RX ADMIN — ONDANSETRON 4 MG: 2 INJECTION INTRAMUSCULAR; INTRAVENOUS at 13:50:00

## 2024-07-24 RX ADMIN — PHENYLEPHRINE HCL 50 MCG: 10 MG/ML VIAL (ML) INJECTION at 13:46:00

## 2024-07-24 RX ADMIN — SODIUM CHLORIDE, SODIUM LACTATE, POTASSIUM CHLORIDE, CALCIUM CHLORIDE: 600; 310; 30; 20 INJECTION, SOLUTION INTRAVENOUS at 14:15:00

## 2024-07-24 RX ADMIN — CEFAZOLIN SODIUM 2 G: 1 INJECTION, POWDER, FOR SOLUTION INTRAMUSCULAR; INTRAVENOUS at 13:45:00

## 2024-07-24 RX ADMIN — ROCURONIUM BROMIDE 30 MG: 10 INJECTION, SOLUTION INTRAVENOUS at 13:32:00

## 2024-07-24 RX ADMIN — MIDAZOLAM HYDROCHLORIDE 2 MG: 1 INJECTION INTRAMUSCULAR; INTRAVENOUS at 13:24:00

## 2024-07-24 RX ADMIN — PHENYLEPHRINE HCL 100 MCG: 10 MG/ML VIAL (ML) INJECTION at 13:59:00

## 2024-07-24 NOTE — PROGRESS NOTES
Ann Tiwari is a 49 year old female submitting e-visit for diarrhea/constipation, rectal pain.  HPI:   See answers to questionnaire and [employer] message exchange    Current Outpatient Medications   Medication Sig Dispense Refill    Cyanocobalamin (RAPID B-12 ENERGY) 200 MCG/SPRAY Oral Liquid Take 1 spray by mouth 3 (three) times a week at 1600.      ergocalciferol 1.25 MG (01628 UT) Oral Cap Take by mouth every 7 days. (Patient not taking: Reported on 4/4/2024)      Multiple Vitamins-Minerals (MULTIVITAMIN ADULT OR) Take by mouth.        Past Medical History:    Fatigue    Sleep apnea    Stress      Past Surgical History:   Procedure Laterality Date    Insert intrauterine device  08/06/2019    Copper IUD inserted - Dr. Sarah Tolentino     Remove intrauterine device  08/29/2019    C/o spotting, cramping, acne. IUD removed less than 1 month from placement - Dr. Tolentino     [place] teeth removed        Family History   Problem Relation Age of Onset    Heart Attack Father     Prostate Cancer Father 73    Heart Disorder Father     Heart Surgery Father         stents in  heart    Cancer Mother 78        stomach cancer    Heart Attack Mother     Hypertension Mother     Diabetes Mother     Pacemaker Mother     No Known Problems Maternal Grandmother     No Known Problems Maternal Grandfather     No Known Problems Paternal Grandmother     No Known Problems Paternal Grandfather     Colon Cancer Neg     Pancreatic Cancer Neg     Uterine Cancer Neg     Ovarian Cancer Neg     Breast Cancer Neg     Endometriosis Neg     Infertility Neg       Social History:  Social History     Socioeconomic History    Marital status:    Tobacco Use    Smoking status: Never    Smokeless tobacco: Never   Vaping Use    Vaping status: Never Used   Substance and Sexual Activity    Alcohol use: No    Drug use: No    Sexual activity: Not Currently   Other Topics Concern    Caffeine Concern No     Comment: 2 cups coffee 1 cup tea daily    Exercise Yes      Comment: 3-4 d/wk    Seat Belt Yes         ASSESSMENT AND PLAN:       Diagnoses and all orders for this visit:    Rectal pain    Diarrhea, unspecified type    Constipation, unspecified constipation type        Pt reports rectal pain; had diarrhea 2 days ago, now feels constipated.   Reports chills, temp now 99.9, cramps and weakness in legs.    Difficult to assess severity of illness by e-visit  A higher level of care was recommended to pt d/t limitations of telehealth.   Referred to ED tonight if sx severe otherwise to PCP of IC for in-person eval tomorrow.   Refer to [employer] message exchange for specific patient instructions      Duration of the E-visit service:  10 minutes

## 2024-07-24 NOTE — ED INITIAL ASSESSMENT (HPI)
Patient endorses rectal pain since Monday after episode of diarrhea. Patient denies blood in stool, + constipation stated \"I can't fully empty my bowel it felt like it was stuck\" Patient notes fever and chills today. Temp reached 100.4. patient stated she had covid 3 weeks ago. + cough in the last 2 days cough is dry and non productive. Pain 6/10. No thinners no meds taken prior to arrival

## 2024-07-24 NOTE — ANESTHESIA PREPROCEDURE EVALUATION
PRE-OP EVALUATION    Patient Name: Ann Tiwari    Admit Diagnosis: Perianal abscess [K61.0]    Pre-op Diagnosis: Abscess [L02.91]    INCISION AND DRAINAGE OF PERIRECTAL ABSCESS    Anesthesia Procedure: INCISION AND DRAINAGE OF PERIRECTAL ABSCESS    Surgeons and Role:     * Ashley Barbosa MD - Primary    Pre-op vitals reviewed.  Temp: 97.4 °F (36.3 °C)  Pulse: 72  Resp: 16  BP: 103/56  SpO2: 97 %  Body mass index is 26.96 kg/m².    Current medications reviewed.  Hospital Medications:  • [] sodium chloride 0.9% infusion   Intravenous Continuous   • [] HYDROmorphone (Dilaudid) 1 MG/ML injection 0.5 mg  0.5 mg Intravenous Q30 Min PRN   • [] ondansetron (Zofran) 4 MG/2ML injection 4 mg  4 mg Intravenous Q4H PRN   • [COMPLETED] acetaminophen (Tylenol Extra Strength) tab 1,000 mg  1,000 mg Oral Once   • [COMPLETED] ampicillin-sulbactam (Unasyn) 3 g in sodium chloride 0.9% 100mL IVPB-ADD  3 g Intravenous Once   • [] sodium chloride 0.9 % IV bolus 2,205 mL  30 mL/kg Intravenous Continuous   • acetaminophen (Tylenol) tab 650 mg  650 mg Oral Q4H PRN    Or   • HYDROcodone-acetaminophen (Norco) 5-325 MG per tab 1 tablet  1 tablet Oral Q4H PRN    Or   • HYDROcodone-acetaminophen (Norco) 5-325 MG per tab 2 tablet  2 tablet Oral Q4H PRN   • enoxaparin (Lovenox) 40 MG/0.4ML SUBQ injection 40 mg  40 mg Subcutaneous Daily   • HYDROmorphone (Dilaudid) 1 MG/ML injection 0.2 mg  0.2 mg Intravenous Q2H PRN    Or   • HYDROmorphone (Dilaudid) 1 MG/ML injection 0.4 mg  0.4 mg Intravenous Q2H PRN    Or   • HYDROmorphone (Dilaudid) 1 MG/ML injection 0.8 mg  0.8 mg Intravenous Q2H PRN   • piperacillin-tazobactam (Zosyn) 3.375 g in dextrose 5% 100 mL IVPB-ADDV  3.375 g Intravenous Q8H   • [COMPLETED] sodium chloride 0.9 % IV bolus 1,000 mL  1,000 mL Intravenous Once   • [COMPLETED] iopamidol 76% (ISOVUE-370) injection for power injector  100 mL Intravenous ONCE PRN       Outpatient Medications:     Medications Prior  to Admission   Medication Sig Dispense Refill Last Dose   • Cyanocobalamin (RAPID B-12 ENERGY) 200 MCG/SPRAY Oral Liquid Take 1 spray by mouth 3 (three) times a week at 1600. (Patient not taking: Reported on 7/24/2024)   Not Taking   • ergocalciferol 1.25 MG (76894 UT) Oral Cap Take by mouth every 7 days. (Patient not taking: Reported on 4/4/2024)      • Multiple Vitamins-Minerals (MULTIVITAMIN ADULT OR) Take by mouth. (Patient not taking: Reported on 7/24/2024)   Not Taking       Allergies: Egg and Mushroom extract complex      Anesthesia Evaluation    Patient summary reviewed.    Anesthetic Complications  (-) history of anesthetic complications         GI/Hepatic/Renal                                 Cardiovascular        Exercise tolerance: good     MET: >4                                           Endo/Other               (+) anemia                   Pulmonary                    (+) sleep apnea       Neuro/Psych                                  Past Surgical History:   Procedure Laterality Date   • Insert intrauterine device  08/06/2019    Copper IUD inserted - Dr. Sarah Tolentino    • Remove intrauterine device  08/29/2019    C/o spotting, cramping, acne. IUD removed less than 1 month from placement - Dr. Tolentino    • [place] teeth removed       Social History     Socioeconomic History   • Marital status:    Tobacco Use   • Smoking status: Never   • Smokeless tobacco: Never   Vaping Use   • Vaping status: Never Used   Substance and Sexual Activity   • Alcohol use: No   • Drug use: No   • Sexual activity: Not Currently   Other Topics Concern   • Caffeine Concern No     Comment: 2 cups coffee 1 cup tea daily   • Exercise Yes     Comment: 3-4 d/wk   • Seat Belt Yes     History   Drug Use No     Available pre-op labs reviewed.  Lab Results   Component Value Date    WBC 11.4 (H) 07/24/2024    RBC 3.62 (L) 07/24/2024    HGB 10.4 (L) 07/24/2024    HCT 32.3 (L) 07/24/2024    MCV 89.2 07/24/2024    MCH 28.7 07/24/2024     MCHC 32.2 07/24/2024    RDW 12.9 07/24/2024    .0 07/24/2024     Lab Results   Component Value Date     (L) 07/23/2024    K 3.7 07/23/2024     07/23/2024    CO2 27.0 07/23/2024    BUN 8 (L) 07/23/2024    CREATSERUM 0.67 07/23/2024     (H) 07/23/2024    CA 9.4 07/23/2024            Airway      Mallampati: II  Mouth opening: >3 FB  TM distance: > 6 cm   Cardiovascular    Cardiovascular exam normal.         Dental             Pulmonary    Pulmonary exam normal.                 Other findings        ASA: 2   Plan: general  NPO status verified and patient meets guidelines.          Plan/risks discussed with: patient            Present on Admission:  • Hyponatremia

## 2024-07-24 NOTE — PLAN OF CARE
Patient A&Ox4, VSS on RA, , tele. Patient reports mild pain, declined need for pain medications. No redness or skin irritation to perirectal area. Denies drainage or odor. Plan to maintain NPO status for procedure this afternoon. Plan of care reviewed with patient. Patient demonstrates understanding.

## 2024-07-24 NOTE — DISCHARGE INSTRUCTIONS
Home Care Instructions  Anorectal Surgery    MEDICATIONS  For post-operative pain control the medication is usually Norco. This is a narcotic and is best taken by starting with one tablet and repeating every four to six hours as needed.  If the patient does not feel they need the narcotics they shouldn’t take them.  If the pain is severe the patient may take up to two pills every four hours.  If a minor supplement is necessary in addition to the narcotics do not overlap with Tylenol (any product with acetaminophen) as Norco contains Tylenol.  Please supplement with ibuprofen (Aleve or Motrin).  Please ask before resuming blood thinners such as aspirin, Plavix or Coumadin.  All other home medications may be resumed as scheduled.  It is advisable on the day of surgery to take two tablespoons of Milk of Magnesia twice on this day only.  Repeat only if passing hard stool or if there is no bowel movement within 36 hours of surgery.    5 day course of augmentin.     DIET  The patient must resume a high fiber diet immediately.  This is not a good time to get constipated.  If the patient were to pass a hard stool the operative site could be damaged.  See the high fiber diet instruction sheet.  Also, be sure to take Metamucil, Fibercon, Citrucel, Konsyl, Benefiber or another bulk laxative, using the higher recommended daily dose.  There should be no alcohol consumption in the immediate recovery time period or within six hours of taking narcotics.    WOUND CARE  The patient may shower the day of discharge.  The wounds can be washed with soap and water and should be thoroughly cleaned in this manner at least once a day.  No hair dye or chemicals of any kind should get in the wounds.  In addition to showering, starting the day after surgery the patient can start tub baths with hot water.  Hot water, as hot as can be withstood without burning the skin, can be very soothing.  Again, start this on the day after surgery.  Also, the  day after surgery, the patient must start with Sitz baths three times a day.  A Sitz bath is a contraption bought at the pharmacy that enables a patient to get water on the operative site while sitting on a toilet.  There is nothing magical about this device and squirt bottles, tub baths and showers can be substituted for a Sitz bath device.  The minimum daily use is three times a day.  It is recommended to get water on the operative site upon awakening, after bowel movements and prior to bed.  Dry thoroughly with a clean hand towel or dry wash rag.    Okay to remove packing tomorrow. Then okay to place a dry gauze or feminine pad in the underwear for drainage.     ACTIVITY  Every day the patient should be up, showered and dressed.  Each day the patient should be up and around the house.  The patient should not lie in bed and should not stay in pajamas.  We count on the patient being up, coughing, walking and deep breathing to avoid pneumonia and blood clots in the legs.  Once a day the patient should get out of the house and go shopping, go to the mall, the hardware store, the [employer] or a restaurant.  The patient may ride in a car but should not drive the car for at least 48 hours.  Patients should be off narcotics for at least 24 hours prior to being the .   Patients should seek further activity limits at the time of their appointment.    APPOINTMENT  Please call our office today for an appointment three weeks after surgery.  The reason to wait three weeks is because the area is checked by digital rectal exam.  It is at the three week farrukh that most patients can withstand the post-operative examination.  Any fever greater than 100.5, chills, nausea, vomiting, or severe diarrhea please call our office at [phone].  Bleeding after the procedure can be expected, especially with bowel movements.  You can also see blood on the toilet paper, and it can also be enough to turn the toilet bowl pink to dark red.  It can be enough bleeding to soak through a pad overnight.  If you do experience any bleeding greater than a nosebleed you should call our office immediately.  If the patient is unable to urinate and has a full and painful bladder call us immediately.  For life threatening emergencies call 911.  For non-emergent care please call our office after 8:30 a.m. Monday through Friday.  The number listed above is our office number, our phone automatically switches to our answering service if we are not there.  Please do not use the emergency room for non-urgent care.    Thank you for entrusting us with your care.  [facility]--General Surgery

## 2024-07-24 NOTE — ED QUICK NOTES
Orders for admission, patient is aware of plan and ready to go upstairs. Any questions, please call ED RN Bryanna at extension [phone].     Patient Covid vaccination status: Fully vaccinated     COVID Test Ordered in ED: None    COVID Suspicion at Admission: N/A    Running Infusions:  None    Mental Status/LOC at time of transport: Alert x4    Other pertinent information:   CIWA score: N/A   NIH score:  N/A

## 2024-07-24 NOTE — ED PROVIDER NOTES
Patient Seen in: [facility] Emergency Department      History     Chief Complaint   Patient presents with    Fever     Stated Complaint: patient reports rectal pain and fever for two days    Subjective:   HPI    Patient is a 49-year-old female who presents the emergency room with rectal pain.  Patient had diarrhea on Monday.  Says the diarrhea is is improved.  She feels perianal and rectal pain.  Says he feels the need to defecate and feels like she cannot completely empty her bowels.  Low-grade temperature.  Did have COVID 3 weeks ago.  Occasional cough.  Pain is 6 out of 10.  Pain is worse when she coughs.  No other specific complaints.  Patient is otherwise at her medical baseline.    Objective:   Past Medical History:    Fatigue    Sleep apnea    Stress              Past Surgical History:   Procedure Laterality Date    Insert intrauterine device  08/06/2019    Copper IUD inserted - Dr. Sarah Tolentino     Remove intrauterine device  08/29/2019    C/o spotting, cramping, acne. IUD removed less than 1 month from placement - Dr. Tolentino     [place] teeth removed                  Social History     Socioeconomic History    Marital status:    Tobacco Use    Smoking status: Never    Smokeless tobacco: Never   Vaping Use    Vaping status: Never Used   Substance and Sexual Activity    Alcohol use: No    Drug use: No    Sexual activity: Not Currently   Other Topics Concern    Caffeine Concern No     Comment: 2 cups coffee 1 cup tea daily    Exercise Yes     Comment: 3-4 d/wk    Seat Belt Yes              Review of Systems    Positive for stated Chief Complaint: Fever    Other systems are as noted in HPI.  Constitutional and vital signs reviewed.      All other systems reviewed and negative except as noted above.    Physical Exam     ED Triage Vitals [07/23/24 2131]   /79   Pulse 111   Resp 20   Temp 98.5 °F (36.9 °C)   Temp src Temporal   SpO2 98 %   O2 Device None (Room air)       Current Vitals:   Vital  Signs  BP: 101/65  Pulse: 92  Resp: 18  Temp: 98.5 °F (36.9 °C)  Temp src: Temporal  MAP (mmHg): 77    Oxygen Therapy  SpO2: 100 %  O2 Device: None (Room air)            Physical Exam    General: Well-appearing patient of stated age resting comfortably  HEENT: Normocephalic atraumatic.  Nonicteric sclera.  Moist mucous membranes  Lungs: No tachypnea  Cardiac: No tachycardia  Abdomen: Soft nontender  Rectal exam: Performed with nurse chaperone: There is tenderness in the perianal region.  There is no clear abscess or fluctuance.  Skin: No rashes, pallor  Neuro: No focal deficits.  Extremities: No cyanosis/edema    ED Course     Labs Reviewed   COMP METABOLIC PANEL (14) - Abnormal; Notable for the following components:       Result Value    Glucose 119 (*)     Sodium 135 (*)     BUN 8 (*)     All other components within normal limits   CBC W/ DIFFERENTIAL - Abnormal; Notable for the following components:    WBC 12.5 (*)     Neutrophil Absolute Prelim 8.92 (*)     Neutrophil Absolute 8.92 (*)     All other components within normal limits   CBC WITH DIFFERENTIAL WITH PLATELET    Narrative:     The following orders were created for panel order CBC With Differential With Platelet.  Procedure                               Abnormality         Status                     ---------                               -----------         ------                     CBC W/ DIFFERENTIAL[[id number]]          Abnormal            Final result                 Please view results for these tests on the individual orders.   LACTIC ACID, PLASMA   RAINBOW DRAW LAVENDER   RAINBOW DRAW LIGHT GREEN   BLOOD CULTURE   BLOOD CULTURE             White count 12.5       CT abdomen/pelvis: Low-density fluid collection surrounding the thickened wall within the posterior perianal region.  1.9 x 3.4 x 2.8 cm.  Likely perianal abscess.  MDM      Patient presents with perianal pain.  1 episode of diarrhea.  Differential includes perianal abscess, perirectal  abscess, proctitis, diverticulitis, other.  Cannot appreciate a clear perianal abscess.  Will send for CT scan of the abdomen pelvis with IV contrast.  Will check blood work.  Will reassess after blood work and imaging.  Admission disposition: 7/23/2024 11:54 PM           CT scan reviewed and discussed with patient, perianal abscess.  On repeat exam with nurse chaperone is unclear exactly where this abscesses.  Discussed with general surgery.  They suggested admission for observation and drainage in the OR.  Discussed this with patient.  Patient will be admitted.    Patient developed a fever 100.4.  Blood pressure decreased.  Will give 30/kg IV fluids.  Will cover with Unasyn.      Blood pressure improved with IV fluids.  Will admit.                [facility]   part of [facility]      Sepsis Reassessment Note    /65   Pulse 92   Temp 98.5 °F (36.9 °C) (Temporal)   Resp 18   Wt 73.5 kg   LMP 03/19/2024   SpO2 100%   BMI 26.96 kg/m²      I completed the sepsis reassessment at 12:26 AM    Cardiac:  Regularity: Regular  Rate: Normal  Heart Sounds: S1,S2    Lungs:   Right: Clear  Left: Clear    Peripheral Pulses:  Radial: Right 1+ or Left 1+      Capillary Refill:  <3 Secs    Skin:  Temp/Moisture: Warm and Dry  Color: Normal      Spencer Lopez MD  7/24/2024  12:21 AM      Medical Decision Making      Disposition and Plan     Clinical Impression:  1. Perianal abscess         Disposition:  Admit  7/23/2024 11:54 pm    Follow-up:  No follow-up provider specified.        Medications Prescribed:  Current Discharge Medication List                            Hospital Problems       Present on Admission  Date Reviewed: 7/23/2024            ICD-10-CM Noted POA    * (Principal) Perianal abscess K61.0 7/23/2024 Unknown    Hyponatremia E87.1 7/23/2024 Yes

## 2024-07-24 NOTE — ED QUICK NOTES
PT UPDATED ON POC FOR ADMISSION, BLOOD WK, ABX AND FLUIDS. PT AGREES WITH PLAN AND IS AWARE OF NPO STATUS.

## 2024-07-24 NOTE — H&P
40-year-old female who presents to the emergency department with complaints of increased rectal pain.  [facility]  Report of  Surgical Consultation with History and Physical Exam    Ann Tiwari Patient Status:  Observation    1975 MRN [id number]   Location [facility] 3SW-A Attending Ben Chris MD   Hosp Day # 0 PCP Belia Dang DO     Reason for Surgical Consultation:  I am seeing this patient at the request of Dr. Lopez for a perianal abscess.     Chief Complaint:   Rectal pain     History of Present Illness:  Ann Tiwari is a a(n) 49 year old female who presented to the ER yesterday evening with 2 days of worsening rectal pain.     The patient states she had a loose bowel movement on , and following this, she had some mild rectal pain. She states she continued to have mild rectal pain on Monday. She described this pain as muscular and worse with coughing.     She states the pain worsened yesterday. She states around 1-2 PM yesterday, she began having burning pain with chills. This was associated with low grade fevers. T-max 100.4. She denies any additional diarrhea or constipation. She denies BRBPR, melena or mucus in her stools.     She is up to date with colonoscopy. She had a colonoscopy with Dr. Simpson on 24. She had 2 polyps at that time and was recommended 3 year repeat.     Upon presentation to the emergency department, CT scan of the abdomen and pelvis revealed a low-density ovoid fluid collection with surrounding thickened wall within the posterior perianal region measuring 1.9 x 3.4 x 2.8 cm suspicious for a potential perianal abscess.    The patient has had a Tmax of 100.2 during this admission.  Slight leukocytosis at 11.4 today.  Hemoglobin 10.4.  Platelets 212,000.  Lactic acid is within normal limits at 1.0.    The patient has no significant past medical history.  She does not take any blood thinning medications.    The patient has no significant past  surgical history.    History:  Past Medical History:    Fatigue    Sleep apnea    Stress     Past Surgical History:   Procedure Laterality Date    Insert intrauterine device  08/06/2019    Copper IUD inserted - Dr. Sarah Tolentino     Remove intrauterine device  08/29/2019    C/o spotting, cramping, acne. IUD removed less than 1 month from placement - Dr. Tolentino     [place] teeth removed       Family History   Problem Relation Age of Onset    Heart Attack Father     Prostate Cancer Father 73    Heart Disorder Father     Heart Surgery Father         stents in  heart    Cancer Mother 78        stomach cancer    Heart Attack Mother     Hypertension Mother     Diabetes Mother     Pacemaker Mother     No Known Problems Maternal Grandmother     No Known Problems Maternal Grandfather     No Known Problems Paternal Grandmother     No Known Problems Paternal Grandfather     Colon Cancer Neg     Pancreatic Cancer Neg     Uterine Cancer Neg     Ovarian Cancer Neg     Breast Cancer Neg     Endometriosis Neg     Infertility Neg       reports that she has never smoked. She has never used smokeless tobacco. She reports that she does not drink alcohol and does not use drugs.    Allergies:  Allergies   Allergen Reactions    Egg DIARRHEA, NAUSEA AND VOMITING and OTHER (SEE COMMENTS)     Stomach aches    Mushroom Extract Complex DIARRHEA, NAUSEA AND VOMITING and OTHER (SEE COMMENTS)     Stomach aches       Medications:    Current Facility-Administered Medications:     acetaminophen (Tylenol) tab 650 mg, 650 mg, Oral, Q4H PRN **OR** HYDROcodone-acetaminophen (Norco) 5-325 MG per tab 1 tablet, 1 tablet, Oral, Q4H PRN **OR** HYDROcodone-acetaminophen (Norco) 5-325 MG per tab 2 tablet, 2 tablet, Oral, Q4H PRN    enoxaparin (Lovenox) 40 MG/0.4ML SUBQ injection 40 mg, 40 mg, Subcutaneous, Daily    HYDROmorphone (Dilaudid) 1 MG/ML injection 0.2 mg, 0.2 mg, Intravenous, Q2H PRN **OR** HYDROmorphone (Dilaudid) 1 MG/ML injection 0.4 mg, 0.4 mg,  Intravenous, Q2H PRN **OR** HYDROmorphone (Dilaudid) 1 MG/ML injection 0.8 mg, 0.8 mg, Intravenous, Q2H PRN    piperacillin-tazobactam (Zosyn) 3.375 g in dextrose 5% 100 mL IVPB-ADDV, 3.375 g, Intravenous, Q8H    Review of Systems:  Pertinent items are noted in HPI.  Allergic/Immuno:  Review of patient's allergies performed.  Cardiovascular:  Negative for cool extremity and irregular heartbeat/palpitations  Constitutional:  Negative for decreased activity, fever, irritability and lethargy  Endocrine:  Negative for abnormal sleep patterns, increased activity, polydipsia and polyphagia  ENMT:  Negative for ear drainage, hearing loss and nasal drainage  Eyes:  Negative for eye discharge and vision loss  Gastrointestinal: Negative for abdominal pain, constipation, decreased appetite, diarrhea and vomiting  Genitourinary:  positive for rectal pain  Hema/Lymph:  Negative for easy bleeding and easy bruising  Integumentary:  Negative for pruritus and rash  Musculoskeletal:  Negative for bone/joint symptoms  Neurological:  Negative for gait disturbance  Psychiatric:  Negative for inappropriate interaction and psychiatric symptoms  Respiratory:  Negative for cough, dyspnea and wheezing      Physical Exam:  Blood pressure 94/55, pulse 71, temperature 97.4 °F (36.3 °C), temperature source Oral, resp. rate 16, weight 162 lb (73.5 kg), last menstrual period 03/19/2024, SpO2 97%, not currently breastfeeding.    General: Alert, orientated x3.  Cooperative.  No apparent distress.    HEENT: Exam is unremarkable.  Without scleral icterus.  Mucous membranes are moist. EOM are WNL.    Neck: No tenderness to palpitation.  Full range of motion to flexion and extension, lateral rotation and lateral flexion of cervical spine.  No JVD. Supple.     Lungs: No secondary use of accessory respiratory musculature.    : Rectal exam limited to external exam only.  No external hemorrhoids, fissures, fistulas or abscesses.  The patient does have  fullness and tenderness to palpation at the 12 o'clock position of the anal margin.    Extremities:  No lower extremity edema noted.  Without clubbing or cyanosis.      Skin: Normal texture and turgor.      Laboratory Data and Relevant X-rays:  Recent Labs   Lab 07/23/24  2218   RBC 4.26   HGB 12.4   HCT 36.6   MCV 85.9   MCH 29.1   MCHC 33.9   RDW 12.7   NEPRELIM 8.92*   WBC 12.5*   .0       Recent Labs   Lab 07/23/24  2218   *   BUN 8*   CREATSERUM 0.67   CA 9.4   ALB 4.7   *   K 3.7      CO2 27.0   ALKPHO 72   AST 15   ALT 11   BILT 0.3   TP 7.9         No results for input(s): \"PTP\", \"INR\", \"PTT\" in the last 168 hours.    Imaging    Narrative   PROCEDURE:  CT ABDOMEN+PELVIS (CONTRAST ONLY) (CPT=74177)     COMPARISON:  None.     INDICATIONS:  patient reports rectal pain and fever for two days     TECHNIQUE:  CT scanning was performed from the dome of the diaphragm to the pubic symphysis with non-ionic intravenous contrast material. Post contrast coronal MPR imaging was performed.  Dose reduction techniques were used. Dose information is  transmitted to the ACR (American College of Radiology) NRDR (National Radiology Data Registry) which includes the Dose Index Registry.     PATIENT STATED HISTORY:(As transcribed by Technologist)  fever      CONTRAST USED:  80 mLcc of Isovue 370     FINDINGS:    LIVER:  No enlargement, atrophy, abnormal density, or significant focal lesion.  Incidental simple nonenhancing cyst within the lateral segment left lobe of the liver measuring 0.8 cm and posterior segment right lobe of the liver measuring 1.1 cm.  BILIARY:  No visible dilatation or calcification.    PANCREAS:  No lesion, fluid collection, ductal dilatation, or atrophy.    SPLEEN:  No enlargement or focal lesion.    KIDNEYS:  No mass, obstruction, or calcification.    ADRENALS:  No mass or enlargement.    AORTA/VASCULAR:  No aneurysm or dissection.    RETROPERITONEUM:  No mass or adenopathy.     BOWEL/MESENTERY:  No visible mass, obstruction, or bowel wall thickening.    ABDOMINAL WALL:  No mass or hernia.    URINARY BLADDER:  No visible focal wall thickening, lesion, or calculus.    PELVIC NODES:  No adenopathy.    PELVIC ORGANS:  No visible mass.  Pelvic organs appropriate for patient age.    BONES:  No bony lesion or fracture.    LUNG BASES:  No visible pulmonary or pleural disease.    OTHER:  In the posterior perianal region, deep to the gluteal cleft is a ovoid low-density fluid collection with a surrounding thickened wall suggested, measuring approximately 1.9 x 3.4 x 2.8 cm in maximal dimensions.  The appearance is suspicious for a   potential perianal abscess.  Please correlate with patient's clinical findings.                   Impression   CONCLUSION:    There is a low-density ovoid fluid collection with surrounding thickened wall within the posterior perianal region measuring approximately 1.9 x 3.4 x 2.8 cm, suspicious for a potential perianal abscess.        LOCATION:  [facility]        Dictated by (CST): Remberto Lara DO on 7/23/2024 at 11:31 PM      Finalized by (CST): Remberto Lara DO on 7/23/2024 at 11:36 PM       Floresita Sandy PA-C  7/24/2024  8:10 AM    Is this a shared or split note between Advanced Practice Provider and Physician? Yes      Impression:  Patient Active Problem List   Diagnosis    Sprain of medial collateral ligament of knee    Unspecified internal derangement of knee    Well woman exam with routine gynecological exam    Iron deficiency    Special screening for malignant neoplasm of colon    Hyponatremia    Perianal abscess       49-year-old female who presents to the emergency department with complaints of rectal pain.  CT scan abdomen pelvis revealed a posterior perianal abscess measuring 3.4 x 2.8 x 1.9 cm.  On admission the patient did have a low-grade temperature and mild leukocytosis of 11.4.  Treatment options were reviewed in detail with the patient.  At  this time she would like to proceed with examination under anesthesia, incision and drainage of perianal abscess  The risks and benefits of the procedure specifically the need to leave the wound open and packed, possible need to place seton was reviewed in detail.  The patient has no further questions at this time.  The patient reports that her  will be on a flight from 3 15-5 15 today and will not be available by telephone.  The risks, benefits, complications, possible outcomes and alternatives of the procedure were explained to the patient in detail.  Potential complications of this procedure and as with any surgical procedure and anesthesia were reviewed including but not limited to bleeding, infection, thromboembolic event, pneumonia were discussed.  Expected postoperative pain, recuperation and postoperative course and possible complications were also reviewed.  All questions from the patient were answered in detail.  The patient did verbalize understanding and does not have any further questions at this time.  The patient does wish to proceed with the proposed procedure.    I agree with the note above and attest to its accuracy with the following changes below after my interview and examination of the patient    The patient was seen and examined.  Available labs and radiology is noted.    Ashley Barbosa MD FACS    Please note that this report has been produced using speech recognition software and may contain errors related to that system including but not limited to errors in grammar, punctuation and spelling as well as words and phrases that possibly may have been recognized inappropriately.  If there are any questions or concerns please contact the dictating provider for clarification.    The 21st Century Cures Act makes medical notes like these available to patients in the interest of trans parency. Please be advised this is a medical document. Medical documents are intended to carry relevant  information, facts as evident, and the clinical opinion of the practitioner. The medical note is intended as peer to peer communication and may appear blunt or direct. It is written in medical language and may contain abbreviations or verbiage that are unfamiliar.   Again if there are any questions or concerns please contact the dictating provider for clarification.

## 2024-07-24 NOTE — OPERATIVE REPORT
[facility]  Operative Note    Annhari Tiwari Location: OR   CSN [id number] MRN [id number]   Admission Date 7/23/2024 Operation Date 7/23/2024   Attending Physician Ben Chris MD Operating Physician Ashley Barbosa MD       Date of procedure: July 23, 2024    Indication for surgery:   Perianal abscess, rectal pain    Pre-Operative Diagnosis:    Perianal abscess    Post-Operative Diagnosis: Same as above-perianal abscess in the posterior midline.  Abscess cavity measures approximately 3 cm deep.  No evidence of internal fistulous opening       Procedure Performed: Examination under anesthesia, anoscopy-incision, drainage perianal abscess posterior midline    Surgeon(s) and Role:     * Ashley Barbosa MD - Primary    Anesthesia: General    History of Present Illness:           49 year old female who presented to the ER yesterday evening with 2 days of worsening rectal pain.      The patient states she had a loose bowel movement on Sunday, and following this, she had some mild rectal pain. She states she continued to have mild rectal pain on Monday. She described this pain as muscular and worse with coughing.      She states the pain worsened yesterday. She states around 1-2 PM yesterday, she began having burning pain with chills. This was associated with low grade fevers. T-max 100.4. She denies any additional diarrhea or constipation. She denies BRBPR, melena or mucus in her stools.      She is up to date with colonoscopy. She had a colonoscopy with Dr. Simpson on 12/22/24. She had 2 polyps at that time and was recommended 3 year repeat.      Upon presentation to the emergency department, CT scan of the abdomen and pelvis revealed a low-density ovoid fluid collection with surrounding thickened wall within the posterior perianal region measuring 1.9 x 3.4 x 2.8 cm suspicious for a potential perianal abscess.     The patient has had a Tmax of 100.2 during this admission.  Slight leukocytosis at 11.4 today.   Hemoglobin 10.4.  Platelets 212,000.  Lactic acid is within normal limits at 1.0.     The patient has no significant past medical history.  She does not take any blood thinning medications.    Treatment options were reviewed in detail with the patient.  At this time she would like to proceed with examination under anesthesia, incision and drainage of perianal abscess  The risks and benefits of the procedure specifically the need to leave the wound open and packed, possible need to place seton was reviewed in detail.  The patient has no further questions at this time.  The patient reports that her  will be on a flight from 3 15-5 15 today and will not be available by telephone.    Discussed with patient:  The potential risks and benefits of the procedure were discussed in detail with the patient including but not limited to bleeding, infection, seroma/ hematoma formation, postoperative wound complications, possible change in rectal tone, possible non-healing of the fissure requiring further treatment/surgery, possible placement seton.   These another potential intraoperative and perioperative risks including but not limited to thromboembolic events were reviewed.  The patient does not have any further questions at this time and wishes to proceed with surgery.  The patient was marked in the preoperative holding area.    Summary of case:     The patient was taken to the operating room and after the induction of general anesthesia, the patient was turned into a prone position with the appropriate axillary and pelvic padding.  The hips were flexed and the buttocks were taped apart.  The patient was then prepped and draped in the usual sterile fashion.  Rectal examination revealed a density near the posterior midline just off to the left of midline.    An examination under anesthesia and anoscopy was performed.  Smaller internal hemorrhoids were identified.    As these are asymptomatic, these will not be addressed  at this time.    A finder needle was used to locate the abscess cavity near the posterior midline just to the left.  Subsequently a radial incision was made to the left of midline at approximately 11 o'clock position.  The subcutaneous tissue was dissected down with electrocautery.  The abscess cavity was identified and this was entered.  A fair amount of thick white purulent fluid was retrieved.  This was collected and will be sent for aerobic and anaerobic culture.  The wound was probed and loculations were broken up.  The cavity was then copiously irrigated until the irrigant was clear.  Anoscope was then returned into the anal canal.  A lacrimal probe was gently placed into the cavity to seek a internal fistulous communication.  No internal opening was identified.  Therefore seton was not placed.  Working from the rectal mucosa, a small opening or cleft was sought however none was found.  The anoscope was withdrawn at this time.  Local anesthesia was infiltrated into the incision.  The wound was then packed with quarter inch iodoform gauze.   All sponge instrument and needle counts were correct at the conclusion of this procedure.  The patient was returned to a supine position.  She was then extubated.  The patient was taken to the recovery room in stable condition.    The patient's  was called postoperatively however the patient did state that her  was on an airplane flight from approximately 315 to 5:15 PM.  She 's telephone was turned off however a message was left.    EBL:   minimal    Pathologic Specimen:    Abscess fluid for culture    Drains: none    Ashley Barbosa MD      Please note that this report has been produced using speech recognition software and may contain errors related to that system including but not limited to errors in grammar, punctuation and spelling as well as words and phrases that possibly may have been recognized inappropriately.  If there are any questions or  concerns please contact the dictating provider for clarification.

## 2024-07-24 NOTE — PROGRESS NOTES
NURSING ADMISSION NOTE      Patient admitted via Cart  Oriented to room.  Safety precautions initiated.  Bed in low position.  Call light in reach.    Pt A&O x 4 , on RA , TELE-nsr, BM-7/23, Pain controlled with current regimen,   up ad filippo, bolus running, iv abx, NPO, plan: I&d today?

## 2024-07-24 NOTE — PROGRESS NOTES
Discharge instructions including medications, follow-up appointments, and self care expectations reviewed with patient. PIV removed. Dressing change supplies given to patient. Patient tolerated diet, voided in bathroom. All questions answered at this time. Patient demonstrates understanding.

## 2024-07-24 NOTE — ANESTHESIA PROCEDURE NOTES
Airway  Date/Time: 7/24/2024 1:34 PM  Urgency: elective    Airway not difficult    General Information and Staff    Patient location during procedure: OR  Anesthesiologist: Vickie Robert MD  Performed: anesthesiologist   Performed by: Vickie Robert MD  Authorized by: Vickie Robert MD      Indications and Patient Condition  Indications for airway management: anesthesia  Sedation level: deep  Preoxygenated: yes  Patient position: sniffing  Mask difficulty assessment: 1 - vent by mask    Final Airway Details  Final airway type: endotracheal airway      Successful airway: ETT  Cuffed: yes   Successful intubation technique: direct laryngoscopy  Facilitating devices/methods: intubating stylet  Endotracheal tube insertion site: oral  Blade: [place]  Blade size: #3  ETT size (mm): 7.0    Cormack-Lehane Classification: grade I - full view of glottis  Placement verified by: capnometry   Measured from: lips  ETT to lips (cm): 21  Number of attempts at approach: 1

## 2024-07-25 ENCOUNTER — PATIENT OUTREACH (OUTPATIENT)
Dept: CASE MANAGEMENT | Age: 49
End: 2024-07-25

## 2024-07-25 DIAGNOSIS — K61.0 PERIANAL ABSCESS: ICD-10-CM

## 2024-07-25 DIAGNOSIS — Z02.9 ENCOUNTERS FOR UNSPECIFIED ADMINISTRATIVE PURPOSE: Primary | ICD-10-CM

## 2024-07-25 RX ORDER — ACETAMINOPHEN 500 MG
500 TABLET ORAL EVERY 6 HOURS PRN
COMMUNITY

## 2024-07-25 NOTE — ANESTHESIA POSTPROCEDURE EVALUATION
[facility]    Ann Te Patient Status:  Observation   Age/Gender 49 year old female MRN [id number]   Location [facility] 3SW-A Attending No att. providers found   Hosp Day # 0 PCP Belia Dang DO       Anesthesia Post-op Note    INCISION AND DRAINAGE OF PERIRECTAL ABSCESS WITH PACKING    Procedure Summary       Date: 07/24/24 Room / Location:  MAIN OR 03 / [facility] MAIN OR    Anesthesia Start: 1324 Anesthesia Stop: 1426    Procedure: INCISION AND DRAINAGE OF PERIRECTAL ABSCESS WITH PACKING (Anus) Diagnosis:       Abscess      (Abscess [L02.91])    Surgeons: Ashley Barbosa MD Anesthesiologist: Vickie Roebrt MD    Anesthesia Type: general ASA Status: 2            Anesthesia Type: general    Vitals Value Taken Time   /70 07/24/24 1540   Temp 98.2 °F (36.8 °C) 07/24/24 1540   Pulse 93 07/24/24 1540   Resp 18 07/24/24 1540   SpO2 97 % 07/24/24 1540   Vitals shown include unfiled device data.    Patient Location: PACU    Anesthesia Type: general    Airway Patency: patent and extubated    Postop Pain Control: adequate    Mental Status: preanesthetic baseline    Nausea/Vomiting: none    Cardiopulmonary/Hydration status: stable euvolemic    Complications: no apparent anesthesia related complications    Postop vital signs: stable    Dental Exam: Unchanged from Preop    Patient to be discharged from PACU when criteria met.

## 2024-07-25 NOTE — PROGRESS NOTES
Initial Post Discharge Follow Up   Discharge Date: 7/24/24  Contact Date: 7/25/2024    Consent Verification:  Assessment Completed With: Patient  HIPAA Verified?  Yes    Discharge Dx:   INCISION AND DRAINAGE OF PERIRECTAL ABSCESS WITH PACKING     General:   How have you been since your discharge from the hospital? Pt reports she is doing okay. Pt has a sore throat but is able to swallow fine. Pt has minimal pain to procedure site. Pt did remove packing today as advised. Pt is aware to clean sight three times a day. Pt denies fevers/chills, dizziness, weakness, chest pain, shortness of breath, n/v/d, confusion and inability to have a BM, Pt did have a HA and some nausea last night but is feeling better today. Pt is aware to complete abx. Pt is aware to eat prior to taking abx. Pt did notice some blood when wiping after her BM today but not active bleed. Pt is eating and drinking well.   Do you have any pain since discharge?  Yes  Where: I&D site   Rating on pain scale 1-10, 10 being the worst pain you have ever experienced, what is current pain: 2  Alleviating factors: OTC Tylenol as needed- pt is not taking Norco.   Aggravating factors: nothing specific   Is the pain manageable at home? Yes  How well was your pain managed while in the hospital?   On a scale of 1-5   1- Very Poor and 5- Very well   Very Well  When you were leaving the hospital were your discharge instructions reviewed with you? Yes  How well were your discharge instructions explained to you?   On a scale of 1-5   1- Very Poor and 5- Very well   Very Well  Do you have any questions about your discharge instructions?  No  Before leaving the hospital was your diagnoses explained to you? Yes  Do you have any questions about your diagnoses? No  Are you able to perform normal daily activities of living as you have prior to your hospital stay (dressing, bathing, ambulating to the bathroom, etc)? yes  (NCM) Was patient given a different diet per AVS?  no    Medications: Reviewed medication list with the patient. Medications are up to date.   Current Outpatient Medications   Medication Sig Dispense Refill    amoxicillin clavulanate 875-125 MG Oral Tab Take 1 tablet by mouth 2 (two) times daily for 5 days. 10 tablet 0    HYDROcodone-acetaminophen (NORCO) 5-325 MG Oral Tab Take 1 tablet by mouth every 6 (six) hours as needed for Pain. 20 tablet 0    Cyanocobalamin (RAPID B-12 ENERGY) 200 MCG/SPRAY Oral Liquid Take 1 spray by mouth 3 (three) times a week at 1600. (Patient not taking: Reported on 7/24/2024)      ergocalciferol 1.25 MG (33574 UT) Oral Cap Take by mouth every 7 days. (Patient not taking: Reported on 4/4/2024)      Multiple Vitamins-Minerals (MULTIVITAMIN ADULT OR) Take by mouth. (Patient not taking: Reported on 7/24/2024)       Were there any changes to your current medication(s) noted on the AVS? Yes  If so, were these medication changes discussed with you prior to leaving the hospital? Yes  If a new medication was prescribed:    Was the new medication's purpose & side effects reviewed? Yes  Do you have any questions about your new medication? No  Did you  your discharge medications when you left the hospital? Yes  Let's go over your medications together to make sure we are not missing anything. Medications Reviewed  Are there any reasons that keep you from taking your medication as prescribed? No  Are you having any concerns with constipation? No    Discharge medications reviewed/discussed/and reconciled against outpatient medications with patient.  Any changes or updates to medications sent to PCP.  Patient Acknowledged     Referrals/orders at D/C:  Referrals/orders placed at D/C? no  DME ordered at D/C? No    Discharge orders, AVS reviewed and discussed with patient. Any changes or updates to orders sent to PCP.  Patient Acknowledged      SDOH:     Transportation Needs: No Transportation Needs (7/25/2024)    Transportation Needs     Lack of  Transportation: No     Car Seat: Not on file         Financial Resource Strain: Low Risk  (7/25/2024)    Financial Resource Strain     Difficulty of Paying Living Expenses: Not very hard     Med Affordability: No       Follow up appointments:  Reviewed recommended follow up appointments. Pt denies any barriers to keeping/getting to HFU appts.         TCC  Was TCC ordered: No  PCP (If no TCC appointment)  Does patient already have a PCP appointment scheduled? No  NCM Attempted to schedule PCP office TCM appointment with patient   If no appointment scheduled: Explain: Pt declined as she plans to FU with Surgeon     Specialist    Does the patient have any other follow up appointment(s) needing to be scheduled? Yes  If yes: NCM reviewed upcoming specialist appointment with patient: Yes  Does the patient need assistance scheduling appointment(s): No- Pt plans to call today for an appt with the surgeon.     Is there any reason as to why you cannot make your appointment(s)?  No     Needs post D/C:   Now that you are home, are there any needs or concerns you need addressed before your next visit with your PCP?  (DME, meds, questions, etc.): No    Interventions by NCM:   All d/c instructions reviewed with the pt. Reviewed when to call MD vs when to call 911 or go the ED. Discussed s/s of infection/abscess. Educated pt on the importance of taking all meds as prescribed as well as close f/u with PCP/specialists. Pt verbalized understanding and will contact the office with any further questions or concerns.      Overall Rating:   How would you rate the care you received while in the hospital? good    CCM referral placed:    Not Applicable    BOOK BY DATE: 8/7/24

## 2024-07-27 ENCOUNTER — TELEPHONE (OUTPATIENT)
Facility: CLINIC | Age: 49
End: 2024-07-27

## 2024-07-27 RX ORDER — ONDANSETRON 4 MG/1
4 TABLET, ORALLY DISINTEGRATING ORAL EVERY 8 HOURS PRN
Qty: 20 TABLET | Refills: 0 | Status: SHIPPED | OUTPATIENT
Start: 2024-07-27

## 2024-07-27 NOTE — TELEPHONE ENCOUNTER
Attempted to call patient back after she left voicemail on answering service. Left voicemail to call back

## 2024-07-27 NOTE — TELEPHONE ENCOUNTER
Ann is a 49 year old female who underwent incision and drainage of perirectal abscess with Dr. Barbosa on 7/24/2024. She called the answering service today with concerns of nausea and fatigue.    She reports following surgery she has had nausea. She denies vomiting. She reports poor appetite. She reports fatigue. She also reports episodes of diarrhea. She reports she has had more formed bowel movements today. She states she did have diarrhea prior to surgery. She denies chills. She reports she feels like she may get a fever, but states she has checked her temperature and it was 97.8 and 98.1. She denies urinary symptoms including dysuria, hematuria or increased frequency. She reports throat pain. She reports she had covid 3 weeks ago.  She states it is slowly improving. She reports she is taking Augmentin as prescribed. She reports she is tolerating augmentin well as she has not previously tolerated antibiotics in the past. She reports minimal pain to her wound. She reports she is not taking any pain medication.     I discussed with the patient that I would send her a prescription for Zofran to her pharmacy to see if this helps with her nausea. I suspect fatigue is likely secondary to her not ambulating and having poor appetite. I encouraged her to drink plenty of fluids. She can supplement with nutritional supplemental drinks while appetite is poor. I instructed the patient on ER precautions including but not limited to fever, chills, vomiting, lightheadedness, dizziness. She is scheduled to follow up with [facility] general surgery on 8/7/2024. I instructed her to call back with any further questions or concerns.    All the patients questions and concerns were addressed. She voiced understanding and is in agreement with the plan.    Gina Nobles PA-C

## 2024-08-07 ENCOUNTER — OFFICE VISIT (OUTPATIENT)
Facility: LOCATION | Age: 49
End: 2024-08-07
Payer: COMMERCIAL

## 2024-08-07 VITALS — TEMPERATURE: 98 F | HEART RATE: 73 BPM

## 2024-08-07 DIAGNOSIS — Z98.890 POSTOPERATIVE STATE: Primary | ICD-10-CM

## 2024-08-07 DIAGNOSIS — K61.0 PERIANAL ABSCESS: ICD-10-CM

## 2024-08-07 PROCEDURE — 99024 POSTOP FOLLOW-UP VISIT: CPT

## 2024-08-07 NOTE — PROGRESS NOTES
Follow Up Visit Note       Active Problems      1. Postoperative state    2. Perianal abscess          Chief Complaint   Chief Complaint   Patient presents with    Post-Op     PO 7/24 INCISION AND DRAINAGE OF PERIRECTAL ABSCESS WITH PACKING W/Chelsey          History of Present Illness    Ann Tiwari is a 49-year-old female who presents to clinic for continued care and evaluation following I&D of perirectal abscess on 7/24/2024 with Dr. Barbosa.    The patient reports doing well postoperatively.  She reports significant improvement in her rectal discomfort.  She denies taking narcotic or over-the-counter analgesics to alleviate her discomfort.  She denies drainage from the area.She reports daily, nonbloody bowel movements.  She denies fevers or chills.  She reports completing the course of antibiotics as recommended.      Allergies  Ann is allergic to egg and mushroom extract complex.    Past Medical / Surgical / Social / Family History    The past medical and past surgical history have been reviewed by me today.    Past Medical History:    Fatigue    Sleep apnea    Stress     Past Surgical History:   Procedure Laterality Date    Insert intrauterine device  08/06/2019    Copper IUD inserted - Dr. Sarah Tolentino     Other surgical history  07/24/2024    INCISION AND DRAINAGE OF PERIRECTAL ABSCESS WITH PACKING    Remove intrauterine device  08/29/2019    C/o spotting, cramping, acne. IUD removed less than 1 month from placement - Dr. Tolentino     [place] teeth removed         The family history and social history have been reviewed by me today.    Family History   Problem Relation Age of Onset    Heart Attack Father     Prostate Cancer Father 73    Heart Disorder Father     Heart Surgery Father         stents in  heart    Cancer Mother 78        stomach cancer    Heart Attack Mother     Hypertension Mother     Diabetes Mother     Pacemaker Mother     No Known Problems Maternal Grandmother     No Known Problems Maternal  Grandfather     No Known Problems Paternal Grandmother     No Known Problems Paternal Grandfather     Colon Cancer Neg     Pancreatic Cancer Neg     Uterine Cancer Neg     Ovarian Cancer Neg     Breast Cancer Neg     Endometriosis Neg     Infertility Neg      Social History     Socioeconomic History    Marital status:    Tobacco Use    Smoking status: Never    Smokeless tobacco: Never   Vaping Use    Vaping status: Never Used   Substance and Sexual Activity    Alcohol use: No    Drug use: No    Sexual activity: Not Currently   Other Topics Concern    Caffeine Concern No     Comment: 2 cups coffee 1 cup tea daily    Exercise Yes     Comment: 3-4 d/wk    Seat Belt Yes        Current Outpatient Medications:     ondansetron 4 MG Oral Tablet Dispersible, Take 1 tablet (4 mg total) by mouth every 8 (eight) hours as needed for Nausea., Disp: 20 tablet, Rfl: 0    acetaminophen 500 MG Oral Tab, Take 1 tablet (500 mg total) by mouth every 6 (six) hours as needed for Pain., Disp: , Rfl:     HYDROcodone-acetaminophen (NORCO) 5-325 MG Oral Tab, Take 1 tablet by mouth every 6 (six) hours as needed for Pain. (Patient not taking: Reported on 7/25/2024), Disp: 20 tablet, Rfl: 0    Cyanocobalamin (RAPID B-12 ENERGY) 200 MCG/SPRAY Oral Liquid, Take 1 spray by mouth 3 (three) times a week at 1600. (Patient not taking: Reported on 7/24/2024), Disp: , Rfl:     ergocalciferol 1.25 MG (78427 UT) Oral Cap, Take by mouth every 7 days. (Patient not taking: Reported on 4/4/2024), Disp: , Rfl:     Multiple Vitamins-Minerals (MULTIVITAMIN ADULT OR), Take by mouth. (Patient not taking: Reported on 7/24/2024), Disp: , Rfl:      Review of Systems  The Review of Systems has been reviewed by me during today.  Review of Systems   Constitutional:  Negative for chills, diaphoresis, fatigue, fever and unexpected weight change.   HENT:  Negative for hearing loss, nosebleeds, sore throat and trouble swallowing.    Respiratory:  Negative for apnea,  cough, shortness of breath and wheezing.    Cardiovascular:  Negative for chest pain, palpitations and leg swelling.   Gastrointestinal:  Negative for abdominal distention, abdominal pain, anal bleeding, blood in stool, constipation, diarrhea, nausea and vomiting.   Genitourinary:  Negative for difficulty urinating, dysuria, frequency and urgency.   Musculoskeletal:  Negative for arthralgias and myalgias.   Skin:  Negative for color change and rash.   Neurological:  Negative for tremors, syncope and weakness.   Hematological:  Negative for adenopathy. Does not bruise/bleed easily.   Psychiatric/Behavioral:  Negative for behavioral problems and sleep disturbance.         Physical Findings   Pulse 73   Temp 97.7 °F (36.5 °C) (Temporal)   LMP 03/19/2024   Physical Exam  Constitutional:       Appearance: Normal appearance.   HENT:      Head: Normocephalic and atraumatic.   Eyes:      Extraocular Movements: Extraocular movements intact.      Pupils: Pupils are equal, round, and reactive to light.   Pulmonary:      Effort: Pulmonary effort is normal. No respiratory distress.   Abdominal:      General: Abdomen is flat. Bowel sounds are normal. There is no distension.      Palpations: Abdomen is soft. There is no mass.      Tenderness: There is no abdominal tenderness. There is no guarding or rebound.   Genitourinary:     Comments: The patient was examined in prone jackknife position with Gissell MEDEIROS present as chaperone.    External exam of the anus reveals well healed incision site. No surrounding erythema or cellulitis.  No signs of infection.  No active drainage noted.  Musculoskeletal:         General: Normal range of motion.      Cervical back: Normal range of motion.   Skin:     General: Skin is warm.      Coloration: Skin is not jaundiced or pale.      Findings: No erythema.   Neurological:      General: No focal deficit present.      Mental Status: She is alert and oriented to person, place, and time.           Assessment   1. Postoperative state    2. Perianal abscess        Plan   Overall, the patient continues to do well postoperatively.  Continue p.o. Tylenol and Motrin as needed for pain control.  Continue to wash area with soap and water daily.  Continue diet as tolerated.  Continue bowel regimen as needed.  She is to follow-up as needed.  All of the patient's question concerns were addressed.  She expressed understanding and is in agreement this plan.     No orders of the defined types were placed in this encounter.      Imaging & Referrals   None    Follow Up  No follow-ups on file.    ANAM Harmon

## 2024-11-13 DIAGNOSIS — Z13.0 SCREENING FOR DISORDER OF BLOOD AND BLOOD-FORMING ORGANS: ICD-10-CM

## 2024-11-13 DIAGNOSIS — Z00.00 ANNUAL PHYSICAL EXAM: Primary | ICD-10-CM

## 2024-11-13 DIAGNOSIS — Z13.220 SCREENING FOR LIPID DISORDERS: ICD-10-CM

## 2024-11-13 DIAGNOSIS — Z13.228 SCREENING FOR METABOLIC DISORDER: ICD-10-CM

## 2025-01-30 ENCOUNTER — OFFICE VISIT (OUTPATIENT)
Age: 50
End: 2025-01-30
Payer: COMMERCIAL

## 2025-01-30 VITALS
HEART RATE: 74 BPM | TEMPERATURE: 97 F | SYSTOLIC BLOOD PRESSURE: 115 MMHG | DIASTOLIC BLOOD PRESSURE: 68 MMHG | RESPIRATION RATE: 16 BRPM | OXYGEN SATURATION: 100 %

## 2025-01-30 DIAGNOSIS — E61.1 IRON DEFICIENCY: Primary | ICD-10-CM

## 2025-01-30 NOTE — PROGRESS NOTES
Pt here for first Infed . Pt denies any issues or concerns.      Ordering Provider: IVORY Scott for Dr. Liu  Order Exp: one-time dose     Pt tolerated infusion without difficulty or complaint. Reviewed next apt date/time: no appointments set up after today. Patient advised to contact her MD to inform him that her infusion has been done and when does she need to do labs to reassess iron stores.      Education Record  Learner:  Patient  Disease / Diagnosis: Iron Deficiency Anemia  Barriers / Limitations:  None  Method:  Brief focused, Discussion, and Reinforcement  General Topics:  Medication, Side effects and symptom management, and Plan of care reviewed  Outcome:  Shows understanding  Tolerated iron infusion without any issues. Vital signs taken and recorded after a 5-minute flush. Patient discharged in good condition.

## 2025-06-19 ENCOUNTER — HOSPITAL ENCOUNTER (OUTPATIENT)
Dept: GENERAL RADIOLOGY | Facility: HOSPITAL | Age: 50
Discharge: HOME | End: 2025-06-19
Attending: FAMILY MEDICINE
Payer: COMMERCIAL

## 2025-06-19 DIAGNOSIS — E61.1 IRON DEFICIENCY: ICD-10-CM

## 2025-06-19 DIAGNOSIS — K21.00 GASTROESOPHAGEAL REFLUX DISEASE WITH ESOPHAGITIS WITHOUT HEMORRHAGE: ICD-10-CM

## 2025-06-19 PROCEDURE — 74240 X-RAY XM UPR GI TRC 1CNTRST: CPT | Performed by: FAMILY MEDICINE

## 2025-07-03 PROBLEM — E55.9 VITAMIN D DEFICIENCY: Status: ACTIVE | Noted: 2025-07-03

## 2025-07-07 ENCOUNTER — LAB ENCOUNTER (OUTPATIENT)
Dept: LAB | Age: 50
End: 2025-07-07
Attending: FAMILY MEDICINE
Payer: COMMERCIAL

## 2025-07-07 DIAGNOSIS — D50.9 IRON DEFICIENCY ANEMIA, UNSPECIFIED IRON DEFICIENCY ANEMIA TYPE: ICD-10-CM

## 2025-07-07 DIAGNOSIS — E53.8 VITAMIN B12 DEFICIENCY: ICD-10-CM

## 2025-07-07 PROCEDURE — 86340 INTRINSIC FACTOR ANTIBODY: CPT

## 2025-07-07 PROCEDURE — 36415 COLL VENOUS BLD VENIPUNCTURE: CPT

## 2025-07-07 PROCEDURE — 83516 IMMUNOASSAY NONANTIBODY: CPT

## 2025-07-08 LAB — PARIETAL CELL AB: 1.7 UNITS

## 2025-07-09 LAB — INTRINSIC FACTOR ABS: 1 AU/ML

## (undated) DIAGNOSIS — R92.8 ABNORMAL MAMMOGRAM: Primary | ICD-10-CM

## (undated) DEVICE — SLEEVE COMPR MD KNEE LEN SGL USE KENDALL SCD

## (undated) DEVICE — 3M™ MICROPORE™ TAPE, 1530-2: Brand: 3M™ MICROPORE™

## (undated) DEVICE — PTFE COATED BLADE 2.75': Brand: MEDLINE

## (undated) DEVICE — SOLUTION IRRIG 1000ML 0.9% NACL USP BTL

## (undated) DEVICE — ZZ- DISC- NOSUB-SOLUTION RUBBING 4OZ 70% ISO ALC CLR

## (undated) DEVICE — MINI LAP PACK-LF: Brand: MEDLINE INDUSTRIES, INC.

## (undated) DEVICE — GAUZE,SPONGE,FLUFF,6"X6.75",STRL,5/TRAY: Brand: MEDLINE

## (undated) DEVICE — SKN PREP SPNG STKS PVP PNT STR: Brand: MEDLINE INDUSTRIES, INC.

## (undated) DEVICE — UNDYED BRAIDED (POLYGLACTIN 910), SYNTHETIC ABSORBABLE SUTURE: Brand: COATED VICRYL

## (undated) DEVICE — PAD,ABDOMINAL,8"X7.5",ST,LF,20/BX: Brand: MEDLINE INDUSTRIES, INC.

## (undated) DEVICE — GLOVE SUR 6.5 SENSICARE PI PIP CRM PWD F

## (undated) DEVICE — STRIP PK 0.25INX5YD TIGHTLY WVN IODO CURAD

## (undated) DEVICE — SPONGE GZ 4X8IN COT 12 PLY WVN

## (undated) NOTE — LETTER
Ann Tiwari, :1975    CONSENT FOR PROCEDURE/SEDATION    1. I authorize the performance upon Raya Smith  the following: Intrauterine Device Paraguard    2.  I authorize Dr. Regis Martin MD (and whomever is designated as the doctor’s assistant), t Witness: _________________________________________ Date:___________     Physician Signature: _______________________________ Date:___________

## (undated) NOTE — LETTER
Ann Tiwari, :1975    CONSENT FOR PROCEDURE/SEDATION    1. I authorize the performance upon Edson Chaudhry  the following: IUD Removal    2.  I authorize Dr. Tulio Cisse MD (and whomever is designated as the doctor’s assistant), to perform the abov Witness: _________________________________________ Date:___________     Physician Signature: _______________________________ Date:___________

## (undated) NOTE — LETTER
[address]  [address]  Authorization for Surgical Operation and Procedure     Date:___________                                                                                                         Time:__________  I hereby authorize Surgeon(s):  Ashley Barbosa MD, my physician and his/her assistants (if applicable), which may include medical students, residents, and/or fellows, to perform the following surgical operation/ procedure and administer such anesthesia as may be determined necessary by my physician:  Operation/Procedure name (s) Procedure(s):  INCISION AND DRAINAGE OF PERIRECTAL ABSCESS on Ann Te   2.   I recognize that during the surgical operation/procedure, unforeseen conditions may necessitate additional or different procedures than those listed above.  I, therefore, further authorize and request that the above-named surgeon, assistants, or designees perform such procedures as are, in their judgment, necessary and desirable.    3.   My surgeon/physician has discussed prior to my surgery the potential benefits, risks and side effects of this procedure; the likelihood of achieving goals; and potential problems that might occur during recuperation.  They also discussed reasonable alternatives to the procedure, including risks, benefits, and side effects related to the alternatives and risks related to not receiving this procedure.  I have had all my questions answered and I acknowledge that no guarantee has been made as to the result that may be obtained.    4.   Should the need arise during my operation/procedure, which includes change of level of care prior to discharge, I also consent to the administration of blood and/or blood products.  Further, I understand that despite careful testing and screening of blood or blood products by collecting agencies, I may still be subject to ill effects as a result of receiving a blood transfusion and/or blood  products.  The following are some, but not all, of the potential risks that can occur: fever and allergic reactions, hemolytic reactions, transmission of diseases such as Hepatitis, AIDS and Cytomegalovirus (CMV) and fluid overload.  In the event that I wish to have an autologous transfusion of my own blood, or a directed donor transfusion, I will discuss this with my physician.  Check only if Refusing Blood or Blood Products  I understand refusal of blood or blood products as deemed necessary by my physician may have serious consequences to my condition to include possible death. I hereby assume responsibility for my refusal and release the hospital, its personnel, and my physicians from any responsibility for the consequences of my refusal.          o  Refuse      5.   I authorize the use of any specimen, organs, tissues, body parts or foreign objects that may be removed from my body during the operation/procedure for diagnosis, research or teaching purposes and their subsequent disposal by hospital authorities.  I also authorize the release of specimen test results and/or written reports to my treating physician on the hospital medical staff or other referring or consulting physicians involved in my care, at the discretion of the Pathologist or my treating physician.    6.   I consent to the photographing or videotaping of the operations or procedures to be performed, including appropriate portions of my body for medical, scientific, or educational purposes, provided my identity is not revealed by the pictures or by descriptive texts accompanying them.  If the procedure has been photographed/videotaped, the surgeon will obtain the original picture, image, videotape or CD.  The hospital will not be responsible for storage, release or maintenance of the picture, image, tape or CD.    7.   I consent to the presence of a  or observers in the operating room as deemed necessary by my physician or  their designees.    8.   I recognize that in the event my procedure results in extended X-Ray/fluoroscopy time, I may develop a skin reaction.    9. If I have a Do Not Attempt Resuscitation (DNAR) order in place, that status will be suspended while in the operating room, procedural suite, and during the recovery period unless otherwise explicitly stated by me (or a person authorized to consent on my behalf). The surgeon or my attending physician will determine when the applicable recovery period ends for purposes of reinstating the DNAR order.  10. Patients having a sterilization procedure: I understand that if the procedure is successful the results will be permanent and it will therefore be impossible for me to inseminate, conceive, or bear children.  I also understand that the procedure is intended to result in sterility, although the result has not been guaranteed.   11. I acknowledge that my physician has explained sedation/analgesia administration to me including the risk and benefits I consent to the administration of sedation/analgesia as may be necessary or desirable in the judgment of my physician.    I CERTIFY THAT I HAVE READ AND FULLY UNDERSTAND THE ABOVE CONSENT TO OPERATION and/or OTHER PROCEDURE.    _________________________________________  __________________________________  Signature of Patient     Signature of Responsible Person         ___________________________________         Printed Name of Responsible Person           _________________________________                 Relationship to Patient  _________________________________________  ______________________________  Signature of Witness          Date  Time      Patient Name: Ann Tiwari     : 1975                 Printed: 2024     Medical Record #: [id number]                     Page 1 of [address] [place] [address] [place]  [address]    Consent for Anesthesia    Ann URBINA  Te agree to be cared for by an anesthesiologist, who is specially trained to monitor me and give me medicine to put me to sleep or keep me comfortable during my procedure    I understand that my anesthesiologist is not an employee or agent of [facility] [employer]. He or she works for [employer].    As the patient asking for anesthesia services, I agree to:  Allow the anesthesiologist (anesthesia doctor) to give me medicine and do additional procedures as necessary. Some examples are: Starting or using an “IV” to give me medicine, fluids or blood during my procedure, and having a breathing tube placed to help me breathe when I’m asleep (intubation). In the event that my heart stops working properly, I understand that my anesthesiologist will make every effort to sustain my life, unless otherwise directed by [facility] Do Not Resuscitate documents.  Tell my anesthesia doctor before my procedure:  If I am pregnant.  The last time that I ate or drank.  All of the medicines I take (including prescriptions, herbal supplements, and pills I can buy without a prescription (including street drugs/illegal medications). Failure to inform my anesthesiologist about these medicines may increase my risk of anesthetic complications.  If I am allergic to anything or have had a reaction to anesthesia before.  I understand how the anesthesia medicine will help me (benefits).  I understand that with any type of anesthesia medicine there are risks:  The most common risks are: nausea, vomiting, sore throat, muscle soreness, damage to my eyes, mouth, or teeth (from breathing tube placement).  Rare risks include: remembering what happened during my procedure, allergic reactions to medications, injury to my airway, heart, lungs, vision, nerves, or muscles and in extremely rare instances death.  My doctor has explained to me other choices available to me for my care (alternatives).  Pregnant  Patients (“epidural”):  I understand that the risks of having an epidural (medicine given into my back to help control pain during labor), include itching, low blood pressure, difficulty urinating, headache or slowing of the baby’s heart. Very rare risks include infection, bleeding, seizure, irregular heart rhythms and nerve injury.  Regional Anesthesia (“spinal”, “epidural”, & “nerve blocks”):  I understand that rare but potential complications include headache, bleeding, infection, seizure, irregular heart rhythms, and nerve injury.    I can change my mind about having anesthesia services at any time before I get the medicine.    _____________________________________________________________________________  Patient (or Representative) Signature/Relationship to Patient  Date   Time    _____________________________________________________________________________   Name (if used)    Language/Organization   Time    _____________________________________________________________________________  Anesthesiologist Signature     Date   Time  I have discussed the procedure and information above with the patient (or patient’s representative) and answered their questions. The patient or their representative has agreed to have anesthesia services.    _____________________________________________________________________________  Witness        Date   Time  I have verified that the signature is that of the patient or patient’s representative, and that it was signed before the procedure  Patient Name: Ann Tiwari     : 1975                 Printed: 2024     Medical Record #: [id number]                     Page 2 of 2